# Patient Record
Sex: FEMALE | Race: WHITE | NOT HISPANIC OR LATINO | URBAN - METROPOLITAN AREA
[De-identification: names, ages, dates, MRNs, and addresses within clinical notes are randomized per-mention and may not be internally consistent; named-entity substitution may affect disease eponyms.]

---

## 2018-05-05 ENCOUNTER — INPATIENT (INPATIENT)
Facility: HOSPITAL | Age: 83
LOS: 2 days | Discharge: NURSING, REHAB OR LONG-TERM CARE | End: 2018-05-08
Attending: INTERNAL MEDICINE | Admitting: INTERNAL MEDICINE

## 2018-05-05 VITALS
SYSTOLIC BLOOD PRESSURE: 182 MMHG | OXYGEN SATURATION: 94 % | TEMPERATURE: 96 F | HEART RATE: 55 BPM | DIASTOLIC BLOOD PRESSURE: 81 MMHG | RESPIRATION RATE: 20 BRPM

## 2018-05-05 DIAGNOSIS — E03.9 HYPOTHYROIDISM, UNSPECIFIED: ICD-10-CM

## 2018-05-05 DIAGNOSIS — R07.9 CHEST PAIN, UNSPECIFIED: ICD-10-CM

## 2018-05-05 DIAGNOSIS — Z79.82 LONG TERM (CURRENT) USE OF ASPIRIN: ICD-10-CM

## 2018-05-05 DIAGNOSIS — I50.33 ACUTE ON CHRONIC DIASTOLIC (CONGESTIVE) HEART FAILURE: ICD-10-CM

## 2018-05-05 DIAGNOSIS — F03.90 UNSPECIFIED DEMENTIA WITHOUT BEHAVIORAL DISTURBANCE: ICD-10-CM

## 2018-05-05 DIAGNOSIS — I13.0 HYPERTENSIVE HEART AND CHRONIC KIDNEY DISEASE WITH HEART FAILURE AND STAGE 1 THROUGH STAGE 4 CHRONIC KIDNEY DISEASE, OR UNSPECIFIED CHRONIC KIDNEY DISEASE: ICD-10-CM

## 2018-05-05 DIAGNOSIS — I48.91 UNSPECIFIED ATRIAL FIBRILLATION: ICD-10-CM

## 2018-05-05 DIAGNOSIS — M19.90 UNSPECIFIED OSTEOARTHRITIS, UNSPECIFIED SITE: ICD-10-CM

## 2018-05-05 DIAGNOSIS — R32 UNSPECIFIED URINARY INCONTINENCE: ICD-10-CM

## 2018-05-05 DIAGNOSIS — N18.9 CHRONIC KIDNEY DISEASE, UNSPECIFIED: ICD-10-CM

## 2018-05-05 DIAGNOSIS — Z98.890 OTHER SPECIFIED POSTPROCEDURAL STATES: Chronic | ICD-10-CM

## 2018-05-05 DIAGNOSIS — E78.5 HYPERLIPIDEMIA, UNSPECIFIED: ICD-10-CM

## 2018-05-05 DIAGNOSIS — I25.10 ATHEROSCLEROTIC HEART DISEASE OF NATIVE CORONARY ARTERY WITHOUT ANGINA PECTORIS: ICD-10-CM

## 2018-05-05 DIAGNOSIS — Z95.0 PRESENCE OF CARDIAC PACEMAKER: ICD-10-CM

## 2018-05-05 LAB
ANION GAP SERPL CALC-SCNC: 13 MMOL/L — SIGNIFICANT CHANGE UP (ref 7–14)
ANION GAP SERPL CALC-SCNC: 14 MMOL/L — SIGNIFICANT CHANGE UP (ref 7–14)
APTT BLD: 29.1 SEC — SIGNIFICANT CHANGE UP (ref 27–39.2)
BASOPHILS # BLD AUTO: 0.01 K/UL — SIGNIFICANT CHANGE UP (ref 0–0.2)
BASOPHILS NFR BLD AUTO: 0.3 % — SIGNIFICANT CHANGE UP (ref 0–1)
BUN SERPL-MCNC: 14 MG/DL — SIGNIFICANT CHANGE UP (ref 10–20)
BUN SERPL-MCNC: 15 MG/DL — SIGNIFICANT CHANGE UP (ref 10–20)
CALCIUM SERPL-MCNC: 9.1 MG/DL — SIGNIFICANT CHANGE UP (ref 8.5–10.1)
CALCIUM SERPL-MCNC: 9.1 MG/DL — SIGNIFICANT CHANGE UP (ref 8.5–10.1)
CHLORIDE SERPL-SCNC: 102 MMOL/L — SIGNIFICANT CHANGE UP (ref 98–110)
CHLORIDE SERPL-SCNC: 103 MMOL/L — SIGNIFICANT CHANGE UP (ref 98–110)
CK MB CFR SERPL CALC: 5.1 NG/ML — SIGNIFICANT CHANGE UP (ref 0.6–6.3)
CK MB CFR SERPL CALC: 5.5 NG/ML — SIGNIFICANT CHANGE UP (ref 0.6–6.3)
CK SERPL-CCNC: 118 U/L — SIGNIFICANT CHANGE UP (ref 0–225)
CK SERPL-CCNC: 172 U/L — SIGNIFICANT CHANGE UP (ref 0–225)
CO2 SERPL-SCNC: 25 MMOL/L — SIGNIFICANT CHANGE UP (ref 17–32)
CO2 SERPL-SCNC: 25 MMOL/L — SIGNIFICANT CHANGE UP (ref 17–32)
CREAT SERPL-MCNC: 1.1 MG/DL — SIGNIFICANT CHANGE UP (ref 0.7–1.5)
CREAT SERPL-MCNC: 1.1 MG/DL — SIGNIFICANT CHANGE UP (ref 0.7–1.5)
EOSINOPHIL # BLD AUTO: 0.28 K/UL — SIGNIFICANT CHANGE UP (ref 0–0.7)
EOSINOPHIL NFR BLD AUTO: 7.9 % — SIGNIFICANT CHANGE UP (ref 0–8)
GLUCOSE SERPL-MCNC: 91 MG/DL — SIGNIFICANT CHANGE UP (ref 70–99)
GLUCOSE SERPL-MCNC: 99 MG/DL — SIGNIFICANT CHANGE UP (ref 70–99)
HCT VFR BLD CALC: 39.7 % — SIGNIFICANT CHANGE UP (ref 37–47)
HGB BLD-MCNC: 12.9 G/DL — SIGNIFICANT CHANGE UP (ref 12–16)
IMM GRANULOCYTES NFR BLD AUTO: 0.3 % — SIGNIFICANT CHANGE UP (ref 0.1–0.3)
INR BLD: 1.08 RATIO — SIGNIFICANT CHANGE UP (ref 0.65–1.3)
LYMPHOCYTES # BLD AUTO: 0.92 K/UL — LOW (ref 1.2–3.4)
LYMPHOCYTES # BLD AUTO: 25.8 % — SIGNIFICANT CHANGE UP (ref 20.5–51.1)
MAGNESIUM SERPL-MCNC: 2.1 MG/DL — SIGNIFICANT CHANGE UP (ref 1.8–2.4)
MCHC RBC-ENTMCNC: 32.5 G/DL — SIGNIFICANT CHANGE UP (ref 32–37)
MCHC RBC-ENTMCNC: 34 PG — HIGH (ref 27–31)
MCV RBC AUTO: 104.7 FL — HIGH (ref 81–99)
MONOCYTES # BLD AUTO: 0.61 K/UL — HIGH (ref 0.1–0.6)
MONOCYTES NFR BLD AUTO: 17.1 % — HIGH (ref 1.7–9.3)
NEUTROPHILS # BLD AUTO: 1.73 K/UL — SIGNIFICANT CHANGE UP (ref 1.4–6.5)
NEUTROPHILS NFR BLD AUTO: 48.6 % — SIGNIFICANT CHANGE UP (ref 42.2–75.2)
NRBC # BLD: 0 /100 WBCS — SIGNIFICANT CHANGE UP (ref 0–0)
NT-PROBNP SERPL-SCNC: HIGH PG/ML (ref 0–300)
PLATELET # BLD AUTO: 95 K/UL — LOW (ref 130–400)
POTASSIUM SERPL-MCNC: 4.4 MMOL/L — SIGNIFICANT CHANGE UP (ref 3.5–5)
POTASSIUM SERPL-MCNC: 5.9 MMOL/L — HIGH (ref 3.5–5)
POTASSIUM SERPL-SCNC: 4.4 MMOL/L — SIGNIFICANT CHANGE UP (ref 3.5–5)
POTASSIUM SERPL-SCNC: 5.9 MMOL/L — HIGH (ref 3.5–5)
PROTHROM AB SERPL-ACNC: 11.7 SEC — SIGNIFICANT CHANGE UP (ref 9.95–12.87)
RBC # BLD: 3.79 M/UL — LOW (ref 4.2–5.4)
RBC # FLD: 13.2 % — SIGNIFICANT CHANGE UP (ref 11.5–14.5)
SODIUM SERPL-SCNC: 141 MMOL/L — SIGNIFICANT CHANGE UP (ref 135–146)
SODIUM SERPL-SCNC: 141 MMOL/L — SIGNIFICANT CHANGE UP (ref 135–146)
TROPONIN T SERPL-MCNC: <0.01 NG/ML — SIGNIFICANT CHANGE UP
TROPONIN T SERPL-MCNC: <0.01 NG/ML — SIGNIFICANT CHANGE UP
WBC # BLD: 3.56 K/UL — LOW (ref 4.8–10.8)
WBC # FLD AUTO: 3.56 K/UL — LOW (ref 4.8–10.8)

## 2018-05-05 RX ORDER — ATORVASTATIN CALCIUM 80 MG/1
10 TABLET, FILM COATED ORAL AT BEDTIME
Qty: 0 | Refills: 0 | Status: DISCONTINUED | OUTPATIENT
Start: 2018-05-05 | End: 2018-05-08

## 2018-05-05 RX ORDER — SODIUM CHLORIDE 9 MG/ML
3 INJECTION INTRAMUSCULAR; INTRAVENOUS; SUBCUTANEOUS EVERY 8 HOURS
Qty: 0 | Refills: 0 | Status: DISCONTINUED | OUTPATIENT
Start: 2018-05-05 | End: 2018-05-05

## 2018-05-05 RX ORDER — ASPIRIN/CALCIUM CARB/MAGNESIUM 324 MG
81 TABLET ORAL DAILY
Qty: 0 | Refills: 0 | Status: DISCONTINUED | OUTPATIENT
Start: 2018-05-05 | End: 2018-05-08

## 2018-05-05 RX ORDER — ENOXAPARIN SODIUM 100 MG/ML
40 INJECTION SUBCUTANEOUS DAILY
Qty: 0 | Refills: 0 | Status: DISCONTINUED | OUTPATIENT
Start: 2018-05-05 | End: 2018-05-08

## 2018-05-05 RX ORDER — ASPIRIN/CALCIUM CARB/MAGNESIUM 324 MG
325 TABLET ORAL ONCE
Qty: 0 | Refills: 0 | Status: COMPLETED | OUTPATIENT
Start: 2018-05-05 | End: 2018-05-05

## 2018-05-05 RX ORDER — OXYBUTYNIN CHLORIDE 5 MG
5 TABLET ORAL DAILY
Qty: 0 | Refills: 0 | Status: DISCONTINUED | OUTPATIENT
Start: 2018-05-05 | End: 2018-05-08

## 2018-05-05 RX ORDER — AMIODARONE HYDROCHLORIDE 400 MG/1
100 TABLET ORAL DAILY
Qty: 0 | Refills: 0 | Status: DISCONTINUED | OUTPATIENT
Start: 2018-05-05 | End: 2018-05-07

## 2018-05-05 RX ORDER — LEVOTHYROXINE SODIUM 125 MCG
50 TABLET ORAL DAILY
Qty: 0 | Refills: 0 | Status: DISCONTINUED | OUTPATIENT
Start: 2018-05-05 | End: 2018-05-08

## 2018-05-05 RX ORDER — LABETALOL HCL 100 MG
100 TABLET ORAL
Qty: 0 | Refills: 0 | Status: DISCONTINUED | OUTPATIENT
Start: 2018-05-05 | End: 2018-05-08

## 2018-05-05 RX ORDER — PANTOPRAZOLE SODIUM 20 MG/1
40 TABLET, DELAYED RELEASE ORAL
Qty: 0 | Refills: 0 | Status: DISCONTINUED | OUTPATIENT
Start: 2018-05-05 | End: 2018-05-08

## 2018-05-05 RX ORDER — DOCUSATE SODIUM 100 MG
100 CAPSULE ORAL
Qty: 0 | Refills: 0 | Status: DISCONTINUED | OUTPATIENT
Start: 2018-05-05 | End: 2018-05-08

## 2018-05-05 RX ORDER — FUROSEMIDE 40 MG
20 TABLET ORAL DAILY
Qty: 0 | Refills: 0 | Status: DISCONTINUED | OUTPATIENT
Start: 2018-05-06 | End: 2018-05-07

## 2018-05-05 RX ORDER — FUROSEMIDE 40 MG
40 TABLET ORAL ONCE
Qty: 0 | Refills: 0 | Status: COMPLETED | OUTPATIENT
Start: 2018-05-05 | End: 2018-05-05

## 2018-05-05 RX ORDER — VALSARTAN 80 MG/1
320 TABLET ORAL DAILY
Qty: 0 | Refills: 0 | Status: DISCONTINUED | OUTPATIENT
Start: 2018-05-06 | End: 2018-05-08

## 2018-05-05 RX ORDER — CLONAZEPAM 1 MG
0.5 TABLET ORAL DAILY
Qty: 0 | Refills: 0 | Status: DISCONTINUED | OUTPATIENT
Start: 2018-05-05 | End: 2018-05-08

## 2018-05-05 RX ADMIN — Medication 40 MILLIGRAM(S): at 12:21

## 2018-05-05 RX ADMIN — Medication 100 MILLIGRAM(S): at 18:30

## 2018-05-05 RX ADMIN — Medication 40 MILLIGRAM(S): at 18:30

## 2018-05-05 RX ADMIN — Medication 325 MILLIGRAM(S): at 11:00

## 2018-05-05 RX ADMIN — Medication 0.5 MILLIGRAM(S): at 21:05

## 2018-05-05 RX ADMIN — SODIUM CHLORIDE 3 MILLILITER(S): 9 INJECTION INTRAMUSCULAR; INTRAVENOUS; SUBCUTANEOUS at 11:00

## 2018-05-05 RX ADMIN — ATORVASTATIN CALCIUM 10 MILLIGRAM(S): 80 TABLET, FILM COATED ORAL at 22:41

## 2018-05-05 NOTE — ED PROVIDER NOTE - ATTENDING CONTRIBUTION TO CARE
89F PMH dementia, [facility] resident, afib, ckd, chf, hypothyroid, htn, oa, PVD, PCM, thoracic aneurysm repair, p/w CP, sob. stopped lasix few days ago for unknown reasons. pt poor historian 2/2 dementia. pt states all symptoms have resolved and she is back to baseline. denies fever, cough, abd pain, nvdc, dysuria, BP, LE edema, ha, neck pain, trauma. on exam, AFVSS- bp noted, hypoxic on RA- no resp distress doing well on O2 NC, well sabrina nad, ncat, eomi, perrla, mmm, bilat rales, rrr nl s1s2 no mrg, abd soft ntnd, aaox3, no focal deficits, no le edema or calf ttp, a/p; concern for CHF exac, r/o ACS, H&P not c/w pe, dissection, esoph perf, tamponade, ptx, pna. will do labs, ekg/trop, bnp, CXR, asa,lasix, admit for diuresis

## 2018-05-05 NOTE — ED PROVIDER NOTE - OBJECTIVE STATEMENT
89yF poor historian from [facility] w/ anxiety, dementia, HTN, hypothyroidism, OA, AFib no AC s/p atrial PPM, DLD, CKD, thoracic AA s/p repair, PUD, urinary incontinence, constipation BIBEMS for CP & dyspnea. Patient is poor historian (she states she is still an active , repeats she has 7 children, and denies she has any CC/PMH/meds); information obtained from chart review.    Patient currently denies having any CP or dyspnea and only slightly acknowledges having a brief episode of midsternal CP this morning with an associated episode of brief dyspnea.    PMD Verónica 89yF poor historian from [facility] w/ anxiety, dementia, CAD, HTN, hypothyroidism, OA, AFib no AC s/p atrial PPM, DLD, CKD, thoracic AA s/p repair 2008, PUD, urinary incontinence, constipation BIBEMS for CP, dyspnea, /112. Patient is poor historian (she states she is still an active , repeats she has 7 children, and denies she has any CC/PMH/meds); information obtained from chart review.    Patient currently denies having any CP or dyspnea and only slightly acknowledges having a brief episode of midsternal CP this morning with an associated episode of brief dyspnea.    PMD Verónica 89yF poor historian from [facility] w/ anxiety, dementia, CAD, HTN, hypothyroidism, OA, AFib no AC s/p atrial PPM, DLD, CKD, thoracic AA s/p repair 2008, PUD, urinary incontinence, constipation BIBEMS for CP, dyspnea, /112. Patient is poor historian (she states she is still an active , repeats she has 7 children, and denies she has any CC/PMH/meds); information obtained from chart review.    Patient currently denies having any CP or dyspnea and only slightly acknowledges having a brief episode of midsternal CP this morning with an associated episode of brief dyspnea.    Writer called the [facility] ([phone]) and confirmed the patient's CC. Per [facility] staff, patient has a history of getting up in the morning, complaining of chest pain/dyspnea/BP of 244/122, and then returning to normal shortly thereafter. This occurred this morning as well per [facility] staff. No interventions were performed and SBP decreased to 182 in ED.    PMD Verónica (outpt), PMD2 Kaleigh (while at [facility], [phone]) 89yF poor historian from [facility] w/ anxiety, dementia, CAD, HTN, hypothyroidism, OA, AFib no AC s/p atrial PPM, DLD, CKD, thoracic AA s/p repair 2008, PUD, urinary incontinence, constipation BIBEMS for CP, dyspnea, /112. Patient is poor historian (she states she is still an active , repeats she has 7 children, and denies she has any CC/PMH/meds); information obtained from chart review.    Patient currently denies having any CP or dyspnea and only slightly acknowledges having a brief episode of midsternal CP this morning with an associated episode of brief dyspnea.    Writer called the [facility] ([phone]) and confirmed the patient's CC. Per [facility] staff, patient has a history of getting up in the morning, complaining of chest pain/dyspnea/BP of 244/122, and then returning to normal shortly thereafter. This occurred this morning as well per [facility] staff. No interventions were performed and SBP decreased to 182 in ED.    Per [facility] staff, pt's furosemide was stopped a few days ago by her PMD Kaleigh.    PMD Verónica (outpt), PMD2 Kaleigh (while at [facility], [phone])

## 2018-05-05 NOTE — ED ADULT NURSE NOTE - PMH
Afib    CKD (chronic kidney disease)    HLD (hyperlipidemia)    HTN (hypertension)    Hypothyroid    Osteoarthritis    PUD (peptic ulcer disease)    Thoracic aortic aneurysm without rupture

## 2018-05-05 NOTE — ED ADULT NURSE REASSESSMENT NOTE - NS ED NURSE REASSESS COMMENT FT1
Received pt awake, A&Ox3 denies chest pain or SOB at this time. pt placed back on cardiac monitor , VS stable. Safety maintained, Will continue to monitor

## 2018-05-05 NOTE — ED PROVIDER NOTE - PROGRESS NOTE DETAILS
discussed w Lydia Arzola, recommends admission under his name tele for CHF exac, requests alexandra for cards c/s

## 2018-05-05 NOTE — ED ADULT NURSE NOTE - OBJECTIVE STATEMENT
PT SENT BY [facility] FOR HTN, AND C/O CP AND SOB. AS PER EMS, HTN IMPROVED WITH 02. PT CURRENTLY DENYING CP.

## 2018-05-05 NOTE — CONSULT NOTE ADULT - SUBJECTIVE AND OBJECTIVE BOX
Date of Admission:    CHIEF COMPLAINT: CHF    HISTORY OF PRESENT ILLNESS: 89yFemale with PMH below presented to the hospital for for short of breath and uncontrolled HTN. patient now seen in no distress and daughter at bed side    PAST MEDICAL & SURGICAL HISTORY:  Urinary incontinence  PUD (peptic ulcer disease)  Thoracic aortic aneurysm without rupture  CKD (chronic kidney disease)  HLD (hyperlipidemia)  Afib  Osteoarthritis  Hypothyroid  HTN (hypertension)  H/O exploratory laparotomy: 1960s  History of thoracic aortic aneurysm repair: 2008    HEALTH ISSUES - PROBLEM Dx:        FAMILY HISTORY:  No pertinent family history in first degree relatives    Allergies    ciprofloxacin (Other)  morphine (Unknown)  oxycodone (Unknown)    Intolerances    	  Home Medications:  Acidophilus oral tablet: 1 tab(s) orally once a day (05 May 2018 11:01)  amiodarone 100 mg oral tablet: 1 tab(s) orally once a day (05 May 2018 11:01)  aspirin 81 mg oral tablet: 1 tab(s) orally once a day (05 May 2018 11:01)  clonazePAM 0.5 mg oral tablet: orally once a day (05 May 2018 11:01)  Colace 100 mg oral capsule: 1 cap(s) orally 2 times a day (05 May 2018 11:01)  Daily Kyle oral tablet: 1 tab(s) orally once a day (05 May 2018 11:01)  furosemide 20 mg oral tablet: 1 tab(s) orally once a day (05 May 2018 11:01)  labetalol 100 mg oral tablet: 1 tab(s) orally 2 times a day (05 May 2018 11:01)  levothyroxine 50 mcg (0.05 mg) oral tablet: 1 tab(s) orally once a day (05 May 2018 11:01)  Mapap 500 mg oral capsule:  (05 May 2018 11:01)  oxybutynin 5 mg/24 hours oral tablet, extended release: 1 tab(s) orally once a day (05 May 2018 11:01)  pravastatin 40 mg oral tablet: 1 tab(s) orally once a day (05 May 2018 11:01)  valsartan 320 mg oral tablet: 1 tab(s) orally once a day (05 May 2018 11:01)    MEDICATIONS  (STANDING):  amiodarone   Oral Tab/Cap - Peds 100 milliGRAM(s) Oral daily  aspirin  chewable 81 milliGRAM(s) Oral daily  atorvastatin 10 milliGRAM(s) Oral at bedtime  docusate sodium 100 milliGRAM(s) Oral two times a day  enoxaparin Injectable 40 milliGRAM(s) SubCutaneous daily  furosemide   Injectable 40 milliGRAM(s) IV Push once  labetalol 100 milliGRAM(s) Oral two times a day  levothyroxine 50 MICROGram(s) Oral daily  oxybutynin 5 milliGRAM(s) Oral daily  pantoprazole    Tablet 40 milliGRAM(s) Oral before breakfast    MEDICATIONS  (PRN):              SOCIAL HISTORY:    [ ] Non-smoker  [ ] Smoker  [ ] Alcohol      REVIEW OF SYSTEMS:  CONSTITUTIONAL: No fever, weight loss, or fatigue  CARDIOLOGY: PAtient denies chest pain, shortness of breath or syncopal episodes.   RESPIRATORY: denies shortness of breath, wheezeing.   NEUROLOGICAL: NO weakness, no focal deficits to report.  ENDOCRINOLOGICAL: no recent change in diabetic medications.   GI: no BRBPR, no N,V,diarrhea.    PSYCHIATRY: normal mood and affect  HEENT: no nasal discharge, no ecchymosis  SKIN: no ecchymosis, no breakdown  MUSCULOSKELETAL: Full range of motion x4.      PHYSICAL EXAM:  T(C): 37.1 (05-05-18 @ 15:55), Max: 37.1 (05-05-18 @ 15:55)  HR: 63 (05-05-18 @ 15:55) (55 - 63)  BP: 170/83 (05-05-18 @ 15:55) (170/83 - 182/81)  RR: 18 (05-05-18 @ 15:55) (18 - 20)  SpO2: 99% (05-05-18 @ 15:55) (94% - 99%)  Wt(kg): --  I&O's Summary    Daily     Daily     General Appearance: Normal	  Cardiovascular: Normal S1 S2, No JVD, No murmurs, No edema  Respiratory: Lungs clear to auscultation	  Psychiatry: A & O x 3, Mood & affect appropriate  Gastrointestinal:  Soft, Non-tender  Skin: No rashes, No ecchymoses, No cyanosis	  Neurologic: Non-focal  Extremities: Normal range of motion, No clubbing, cyanosis or edema  Vascular: Peripheral pulses palpable 2+ bilaterally        LABS:	 	                          12.9   3.56  )-----------( 95       ( 05 May 2018 11:13 )             39.7     05-05    141  |  103  |  15  ----------------------------<  99  5.9<H>   |  25  |  1.1    Ca    9.1      05 May 2018 11:13  Mg     2.1     05-05      CARDIAC MARKERS ( 05 May 2018 11:13 )  x     / <0.01 ng/mL / 172 U/L / x     / 5.5 ng/mL      PT/INR - ( 05 May 2018 11:13 )   PT: 11.70 sec;   INR: 1.08 ratio         PTT - ( 05 May 2018 11:13 )  PTT:29.1 sec    proBNP: Serum Pro-Brain Natriuretic Peptide: 25274 pg/mL (05-05 @ 11:13)    Lipid Profile:   HgA1c:   TSH:       CARDIAC MARKERS:            TELEMETRY EVENTS: 	    ECG:  	PAced  RADIOLOGY:  OTHER: 	    PREVIOUS DIAGNOSTIC TESTING:    [ ] Echocardiogram:  [ ]  Catheterization:  [ ] Stress Test:  	  	  ASSESSMENT/PLAN: 	  Patient with AAA, dementia, PPM present for sHORT OF BREATH.  RESTRT HER LASIX.  cONTINUE WITH AMIODARONE FOR sinus   Not in Afib now.  Off AC for unknown reason.  discussed with daughter in details.

## 2018-05-05 NOTE — H&P ADULT - ASSESSMENT
89y.o F w/ Thoracic aortic aneurysm s/p repair x 1, a. fib not on a/c, HTN, CKD, hypothyroidism, OA, PUD, urinary incontinence, CHF had lasix discontinued few days ago, presenting with CP and SOB x 1 dd along with /112 from assisted living, found to have b/l opacities on CXR along w/ elevated BNP and crackles on exam    1- hypoxia / SOB / CP: ddx most likely 2/2 CHF exacerbation vs. HTN urgency vs. unlikely PNA or COPD exacerbation vs. unlikely AAA rupture  admit to tele  s/p lasix 40 x 1 in ED - will give another lasix 40 iv in pm then 40 iv q24  repeat cxr in am  CPAP for now   keep spO2 > 90%  accurate I & O  low salt diet  check 2nd set CE  2D echo  cardio c/s Dr. Mckay as per PMD placed by ED team - please follow up    2- HTN: target SBP ~160 for now  c/w home meds valsartan and labetalol    3- a. fib not on a/c: c.w amiodarone    4- hypothyroidism: c/w synthroid   check tsh    5- GI ppx: protonix  DVT ppx: lovenox  DNR/DNI  OOB to chair 89y.o F w/ Thoracic aortic aneurysm s/p repair x 1, a. fib not on a/c, HTN, CKD, hypothyroidism, OA, PUD, urinary incontinence, CHF had lasix discontinued few days ago, presenting with CP and SOB x 1 dd along with /112 from assisted living, found to have b/l opacities on CXR along w/ elevated BNP and crackles on exam    1- hypoxia / SOB / CP: ddx most likely 2/2 CHF exacerbation vs. HTN urgency vs. unlikely PNA or COPD exacerbation vs. unlikely AAA rupture  admit to tele  s/p lasix 40 x 1 in ED - will give another lasix 40 iv in pm then 20 iv q24  repeat cxr in am  CPAP for now   keep spO2 > 90%  accurate I & O  low salt diet  check 2nd set CE  2D echo  cardio c/s Dr. Mckay as per PMD placed by ED team - please follow up    2- HTN: target SBP ~160 for now  c/w home meds valsartan and labetalol    3- a. fib not on a/c: c.w amiodarone    4- hypothyroidism: c/w synthroid   check tsh    5- GI ppx: protonix  DVT ppx: lovenox  DNR/DNI  OOB to chair

## 2018-05-05 NOTE — ED PROVIDER NOTE - PMH
Afib    CKD (chronic kidney disease)    HLD (hyperlipidemia)    HTN (hypertension)    Hypothyroid    Osteoarthritis    PUD (peptic ulcer disease)    Thoracic aortic aneurysm without rupture    Urinary incontinence

## 2018-05-05 NOTE — H&P ADULT - HISTORY OF PRESENT ILLNESS
89yF poor historian from [facility] w/ anxiety, dementia, CAD, HTN, hypothyroidism, OA, AFib no AC s/p atrial PPM, DLD, CKD, thoracic AA s/p repair 2008, PUD, urinary incontinence, constipation BIBEMS for CP, dyspnea, /112. Patient is poor historian (she states she is still an active , repeats she has 7 children, and denies she has any CC/PMH/meds); information obtained from chart review.    Patient currently denies having any CP or dyspnea and only slightly acknowledges having a brief episode of midsternal CP this morning with an associated episode of brief dyspnea.    Writer called the [facility] ([phone]) and confirmed the patient's CC. Per [facility] staff, patient has a history of getting up in the morning, complaining of chest pain/dyspnea/BP of 244/122, and then returning to normal shortly thereafter. This occurred this morning as well per [facility] staff. No interventions were performed and SBP decreased to 182 in ED.    	Per [facility] staff, pt's furosemide was stopped a few days ago by her PMD Kaleigh.    in ED:  / lasix 40 IV 89y.o F w/ listed PMHx including dementia, AAA s/p repair x 1, a. fib not on a/c, HTN, CHF had lasix discontinued few days ago, presenting with CP and SOB x 1 dd along with /112.  patient's daughter at bedside providing hx and she is her HCP.  on interview, patient denied CP or SOB but reports having felt that earlier during the day. as per daughter she had a brief episode of midsternal CP this morning with an associated episode of brief dyspnea and hypoxia on admission with SpO2 ~86%. pt had no fevers or chills, no cough or sputum, no lightheadedness, no palpitations, no LOC or falls.  ROS otherwise negative.    in ED:  / lasix 40 IV

## 2018-05-05 NOTE — ED PROVIDER NOTE - PHYSICAL EXAMINATION
Gen: NAD, pleasant, cooperative  HEENT: NCAT, EOMI  Cards: RRR, S1-S2 present, no G/M/R  Resp: coarse breath sounds b/l  Abd: S, NT, ND, +BS. No pain on palpation. Midline surgical scar.  Neuro: AOx3 despite poor historian, nonfocal  Ext: No clubbing/cyanosis, chronic venous stasis changes of b/l LEs, 2+ radial & posterior tibial pulses, trace pitting edema b/l

## 2018-05-05 NOTE — H&P ADULT - NSHPPHYSICALEXAM_GEN_ALL_CORE
GENERAL: NAD, well-developed  HEAD:  Atraumatic, Normocephalic  CHEST/LUNG: + crackles b/l, no rhonchi or wheezes  HEART: irregularly irregular, no murmurs, nl S1 S2  ABDOMEN: Soft, Nontender, Nondistended; Bowel sounds present  EXTREMITIES:  +1 LE edema, + pulses b/l, + venous stasis changes  PSYCH: AAOx1  NEUROLOGY: non-focal

## 2018-05-05 NOTE — H&P ADULT - NSHPLABSRESULTS_GEN_ALL_CORE
12.9   3.56  )-----------( 95       ( 05 May 2018 11:13 )             39.7       05-05    141  |  103  |  15  ----------------------------<  99  5.9<H>   |  25  |  1.1    Ca    9.1      05 May 2018 11:13  Mg     2.1     05-05      PT/INR - ( 05 May 2018 11:13 )   PT: 11.70 sec;   INR: 1.08 ratio         PTT - ( 05 May 2018 11:13 )  PTT:29.1 sec      CARDIAC MARKERS ( 05 May 2018 11:13 )  x     / <0.01 ng/mL / 172 U/L / x     / 5.5 ng/mL    EKG: atrial paced rhythm    CXR (unofficial): b/l diffuse interstitial opacities suggestive of pulm congestion

## 2018-05-06 LAB
ANION GAP SERPL CALC-SCNC: 13 MMOL/L — SIGNIFICANT CHANGE UP (ref 7–14)
BASOPHILS # BLD AUTO: 0.02 K/UL — SIGNIFICANT CHANGE UP (ref 0–0.2)
BASOPHILS NFR BLD AUTO: 0.4 % — SIGNIFICANT CHANGE UP (ref 0–1)
BUN SERPL-MCNC: 14 MG/DL — SIGNIFICANT CHANGE UP (ref 10–20)
CALCIUM SERPL-MCNC: 9.1 MG/DL — SIGNIFICANT CHANGE UP (ref 8.5–10.1)
CHLORIDE SERPL-SCNC: 96 MMOL/L — LOW (ref 98–110)
CO2 SERPL-SCNC: 30 MMOL/L — SIGNIFICANT CHANGE UP (ref 17–32)
CREAT SERPL-MCNC: 1 MG/DL — SIGNIFICANT CHANGE UP (ref 0.7–1.5)
EOSINOPHIL # BLD AUTO: 0.27 K/UL — SIGNIFICANT CHANGE UP (ref 0–0.7)
EOSINOPHIL NFR BLD AUTO: 5.2 % — SIGNIFICANT CHANGE UP (ref 0–8)
GLUCOSE SERPL-MCNC: 94 MG/DL — SIGNIFICANT CHANGE UP (ref 70–99)
HCT VFR BLD CALC: 42.7 % — SIGNIFICANT CHANGE UP (ref 37–47)
HGB BLD-MCNC: 14.5 G/DL — SIGNIFICANT CHANGE UP (ref 12–16)
IMM GRANULOCYTES NFR BLD AUTO: 0.4 % — HIGH (ref 0.1–0.3)
LYMPHOCYTES # BLD AUTO: 0.76 K/UL — LOW (ref 1.2–3.4)
LYMPHOCYTES # BLD AUTO: 14.6 % — LOW (ref 20.5–51.1)
MAGNESIUM SERPL-MCNC: 1.8 MG/DL — SIGNIFICANT CHANGE UP (ref 1.8–2.4)
MCHC RBC-ENTMCNC: 34 G/DL — SIGNIFICANT CHANGE UP (ref 32–37)
MCHC RBC-ENTMCNC: 34.2 PG — HIGH (ref 27–31)
MCV RBC AUTO: 100.7 FL — HIGH (ref 81–99)
MONOCYTES # BLD AUTO: 0.74 K/UL — HIGH (ref 0.1–0.6)
MONOCYTES NFR BLD AUTO: 14.2 % — HIGH (ref 1.7–9.3)
NEUTROPHILS # BLD AUTO: 3.39 K/UL — SIGNIFICANT CHANGE UP (ref 1.4–6.5)
NEUTROPHILS NFR BLD AUTO: 65.2 % — SIGNIFICANT CHANGE UP (ref 42.2–75.2)
PLATELET # BLD AUTO: 91 K/UL — LOW (ref 130–400)
POTASSIUM SERPL-MCNC: 3.7 MMOL/L — SIGNIFICANT CHANGE UP (ref 3.5–5)
POTASSIUM SERPL-SCNC: 3.7 MMOL/L — SIGNIFICANT CHANGE UP (ref 3.5–5)
RBC # BLD: 4.24 M/UL — SIGNIFICANT CHANGE UP (ref 4.2–5.4)
RBC # FLD: 13.1 % — SIGNIFICANT CHANGE UP (ref 11.5–14.5)
SODIUM SERPL-SCNC: 139 MMOL/L — SIGNIFICANT CHANGE UP (ref 135–146)
TSH SERPL-MCNC: 3.21 UIU/ML — SIGNIFICANT CHANGE UP (ref 0.27–4.2)
WBC # BLD: 5.2 K/UL — SIGNIFICANT CHANGE UP (ref 4.8–10.8)
WBC # FLD AUTO: 5.2 K/UL — SIGNIFICANT CHANGE UP (ref 4.8–10.8)

## 2018-05-06 RX ADMIN — Medication 0.5 MILLIGRAM(S): at 12:38

## 2018-05-06 RX ADMIN — PANTOPRAZOLE SODIUM 40 MILLIGRAM(S): 20 TABLET, DELAYED RELEASE ORAL at 08:00

## 2018-05-06 RX ADMIN — Medication 50 MICROGRAM(S): at 06:45

## 2018-05-06 RX ADMIN — Medication 100 MILLIGRAM(S): at 07:59

## 2018-05-06 RX ADMIN — ATORVASTATIN CALCIUM 10 MILLIGRAM(S): 80 TABLET, FILM COATED ORAL at 22:44

## 2018-05-06 RX ADMIN — Medication 100 MILLIGRAM(S): at 18:47

## 2018-05-06 RX ADMIN — Medication 100 MILLIGRAM(S): at 06:45

## 2018-05-06 RX ADMIN — VALSARTAN 320 MILLIGRAM(S): 80 TABLET ORAL at 06:45

## 2018-05-06 RX ADMIN — Medication 81 MILLIGRAM(S): at 12:38

## 2018-05-06 RX ADMIN — ENOXAPARIN SODIUM 40 MILLIGRAM(S): 100 INJECTION SUBCUTANEOUS at 12:38

## 2018-05-06 RX ADMIN — AMIODARONE HYDROCHLORIDE 100 MILLIGRAM(S): 400 TABLET ORAL at 06:45

## 2018-05-06 RX ADMIN — Medication 20 MILLIGRAM(S): at 06:45

## 2018-05-06 RX ADMIN — Medication 5 MILLIGRAM(S): at 12:38

## 2018-05-06 NOTE — PROGRESS NOTE ADULT - SUBJECTIVE AND OBJECTIVE BOX
Pt seen,  chart reviewed  Cardiology consult appreciated  Breathing better  F/U CxR better  on IV lasix        SOCIAL HISTORY:  n/a    REVIEW OF SYSTEMS:    Constitutional: No fever, weight loss or fatigue  ENT:  No difficulty hearing, tinnitus, vertigo; No sinus or throat pain  Neck: No pain or stiffness  Respiratory: No cough, wheezing, chills or hemoptysis  Cardiovascular: No chest pain, palpitations, shortness of breath, dizziness or leg swelling  Gastrointestinal: No abdominal or epigastric pain. No nausea, vomiting or hematemesis; No diarrhea or constipation. No melena or hematochezia.  Neurological: No headaches, memory loss, loss of strength, numbness or tremors  Musculoskeletal: No joint pain or swelling; No muscle, back or extremity pain  Psychiatric: No depression, anxiety, mood swings or difficulty sleeping    MEDICATIONS  (STANDING):  amiodarone   Oral Tab/Cap - Peds 100 milliGRAM(s) Oral daily  aspirin  chewable 81 milliGRAM(s) Oral daily  atorvastatin 10 milliGRAM(s) Oral at bedtime  clonazePAM Tablet 0.5 milliGRAM(s) Oral daily  docusate sodium 100 milliGRAM(s) Oral two times a day  enoxaparin Injectable 40 milliGRAM(s) SubCutaneous daily  furosemide   Injectable 20 milliGRAM(s) IV Push daily  labetalol 100 milliGRAM(s) Oral two times a day  levothyroxine 50 MICROGram(s) Oral daily  oxybutynin 5 milliGRAM(s) Oral daily  pantoprazole    Tablet 40 milliGRAM(s) Oral before breakfast  valsartan 320 milliGRAM(s) Oral daily    MEDICATIONS  (PRN):      Vital Signs Last 24 Hrs  T(C): 35.7 (06 May 2018 07:37), Max: 37.1 (05 May 2018 15:55)  T(F): 96.2 (06 May 2018 07:37), Max: 98.7 (05 May 2018 15:55)  HR: 60 (06 May 2018 07:37) (59 - 65)  BP: 121/58 (06 May 2018 07:37) (121/58 - 170/83)  BP(mean): --  RR: 18 (06 May 2018 07:37) (18 - 18)  SpO2: 92% (06 May 2018 07:37) (92% - 100%)    PHYSICAL EXAM:    Constitutional: NAD, well-groomed, well-developed  HEENT: PERRLA, EOMI, Normal Hearing, MMM  Neck: No LAD, No JVD  Back: Normal spine flexure, No CVA tenderness  Respiratory: rare ronchi(+)  Cardiovascular: S1 and S2, RRR, no M/G/R  Gastrointestinal: BS+, soft, NT/ND  Extremities: No peripheral edema  Vascular: 2+ peripheral pulses  Neurological: A/O x 3, no focal deficits    LABS:                        14.5   5.20  )-----------( 91       ( 06 May 2018 07:39 )             42.7     05-06    139  |  96<L>  |  14  ----------------------------<  94  3.7   |  30  |  1.0    Ca    9.1      06 May 2018 07:39  Mg     1.8     05-06      PT/INR - ( 05 May 2018 11:13 )   PT: 11.70 sec;   INR: 1.08 ratio         PTT - ( 05 May 2018 11:13 )  PTT:29.1 sec    Drug Screen Urine:  Alcohol Level  N/A        RADIOLOGY & ADDITIONAL STUDIES:  Noted in PACS  f/u CxR better

## 2018-05-06 NOTE — PROGRESS NOTE ADULT - ASSESSMENT
CHF improving  A Fib  CAD      await echo  If continues to improve may d/c back to [facility] tomorrow

## 2018-05-07 RX ORDER — FUROSEMIDE 40 MG
20 TABLET ORAL DAILY
Qty: 0 | Refills: 0 | Status: DISCONTINUED | OUTPATIENT
Start: 2018-05-07 | End: 2018-05-08

## 2018-05-07 RX ORDER — AMIODARONE HYDROCHLORIDE 400 MG/1
100 TABLET ORAL DAILY
Qty: 0 | Refills: 0 | Status: DISCONTINUED | OUTPATIENT
Start: 2018-05-07 | End: 2018-05-08

## 2018-05-07 RX ADMIN — ATORVASTATIN CALCIUM 10 MILLIGRAM(S): 80 TABLET, FILM COATED ORAL at 22:32

## 2018-05-07 RX ADMIN — PANTOPRAZOLE SODIUM 40 MILLIGRAM(S): 20 TABLET, DELAYED RELEASE ORAL at 08:00

## 2018-05-07 RX ADMIN — Medication 100 MILLIGRAM(S): at 17:56

## 2018-05-07 RX ADMIN — Medication 81 MILLIGRAM(S): at 11:20

## 2018-05-07 RX ADMIN — Medication 0.5 MILLIGRAM(S): at 11:20

## 2018-05-07 RX ADMIN — Medication 5 MILLIGRAM(S): at 11:18

## 2018-05-07 RX ADMIN — Medication 20 MILLIGRAM(S): at 06:27

## 2018-05-07 RX ADMIN — VALSARTAN 320 MILLIGRAM(S): 80 TABLET ORAL at 06:28

## 2018-05-07 RX ADMIN — Medication 100 MILLIGRAM(S): at 06:27

## 2018-05-07 RX ADMIN — ENOXAPARIN SODIUM 40 MILLIGRAM(S): 100 INJECTION SUBCUTANEOUS at 11:18

## 2018-05-07 RX ADMIN — AMIODARONE HYDROCHLORIDE 100 MILLIGRAM(S): 400 TABLET ORAL at 06:39

## 2018-05-07 RX ADMIN — Medication 50 MICROGRAM(S): at 06:28

## 2018-05-07 NOTE — PROGRESS NOTE ADULT - SUBJECTIVE AND OBJECTIVE BOX
SUBJ:  No new complains, confused    MEDICATIONS  (STANDING):  amiodarone    Tablet 100 milliGRAM(s) Oral daily  aspirin  chewable 81 milliGRAM(s) Oral daily  atorvastatin 10 milliGRAM(s) Oral at bedtime  clonazePAM Tablet 0.5 milliGRAM(s) Oral daily  docusate sodium 100 milliGRAM(s) Oral two times a day  enoxaparin Injectable 40 milliGRAM(s) SubCutaneous daily  furosemide   Injectable 20 milliGRAM(s) IV Push daily  labetalol 100 milliGRAM(s) Oral two times a day  levothyroxine 50 MICROGram(s) Oral daily  oxybutynin 5 milliGRAM(s) Oral daily  pantoprazole    Tablet 40 milliGRAM(s) Oral before breakfast  valsartan 320 milliGRAM(s) Oral daily    MEDICATIONS  (PRN):            Vital Signs Last 24 Hrs  T(C): 35.6 (07 May 2018 07:40), Max: 36.1 (06 May 2018 11:37)  T(F): 96 (07 May 2018 07:40), Max: 97 (06 May 2018 11:37)  HR: 60 (07 May 2018 07:40) (60 - 66)  BP: 109/54 (07 May 2018 07:40) (109/54 - 142/68)  BP(mean): --  RR: 18 (07 May 2018 07:40) (18 - 18)  SpO2: 92% (07 May 2018 07:40) (92% - 97%)     REVIEW OF SYSTEMS:  CONSTITUTIONAL: No fever, weight loss, or fatigue  CARDIOLOGY: PAtient denies chest pain, shortness of breath or syncopal episodes.   RESPIRATORY: denies shortness of breath, wheezeing.   NEUROLOGICAL: NO weakness, no focal deficits to report.  ENDOCRINOLOGICAL: no recent change in diabetic medications.   GI: no BRBPR, no N,V,diarrhea.    PSYCHIATRY: normal mood and affect  HEENT: no nasal discharge, no ecchymosis  SKIN: no ecchymosis, no breakdown  MUSCULOSKELETAL: Full range of motion x4.        PHYSICAL EXAM:  · CONSTITUTIONAL:	Well-developed, well nourished    BMI-  ·RESPIRATORY:   airway patent; breath sounds equal; good air movement; respirations non-labored; clear to auscultation bilaterally; no chest wall tenderness; no intercostal retractions; no rales,rhonchi or wheeze  · CARDIOVASCULAR	regular rate and rhythm  no rub  no murmur  normal PMI  · EXTREMITIES: No cyanosis, clubbing or edema  · VASCULAR: 	Equal and normal pulses (carotid, femoral, dorsalis pedis)  	  TELEMETRY:    ECG:    TTE:    LABS:                        14.5   5.20  )-----------( 91       ( 06 May 2018 07:39 )             42.7     05-06    139  |  96<L>  |  14  ----------------------------<  94  3.7   |  30  |  1.0    Ca    9.1      06 May 2018 07:39  Mg     1.8     05-06      CARDIAC MARKERS ( 05 May 2018 17:22 )  x     / <0.01 ng/mL / 118 U/L / x     / 5.1 ng/mL  CARDIAC MARKERS ( 05 May 2018 11:13 )  x     / <0.01 ng/mL / 172 U/L / x     / 5.5 ng/mL      PT/INR - ( 05 May 2018 11:13 )   PT: 11.70 sec;   INR: 1.08 ratio         PTT - ( 05 May 2018 11:13 )  PTT:29.1 sec    I&O's Summary    BNP  RADIOLOGY & ADDITIONAL STUDIES:    IMPRESSION AND PLAN:    CHF bettre, change lasix to PO  Echocardiogram reviewed EF 35%, not candidate for intervention or ICD.  Maximize medical therapy and BP control.  daily weight.

## 2018-05-07 NOTE — PROGRESS NOTE ADULT - ASSESSMENT
89 y/o F w/ listed PMHx including dementia, AAA s/p repair x 1, a. fib not on a/c, HTN, CHF had lasix discontinued few days ago, presenting with CP and SOB x 1 dd along with /112. She had a brief episode of midsternal CP on am of presentation with an associated episode of brief dyspnea and hypoxia on admission with SpO2 ~86%.    1. CHF Ex  -as per Cardio: CHF better, will change lasix to PO  Echocardiogram reviewed EF 35%, not candidate for intervention or ICD  will maximize medical therapy and BP control  daily weight, strict I/O  2. Afib: c/w amiodarone, not on any a/c from before  3. HTN: c/w valsartan and labetalol  4. Hypothyroidism: c/w synthroid  5. DVT PPx: lovenox 89 y/o F w/ listed PMHx including dementia, AAA s/p repair x 1, a. fib not on a/c, HTN, CHF had lasix discontinued few days ago, presenting with CP and SOB x 1 dd along with /112. She had a brief episode of midsternal CP on am of presentation with an associated episode of brief dyspnea and hypoxia on admission with SpO2 ~86%.    1. CHF Ex  -as per Cardio: CHF better, will change lasix to PO  Echocardiogram reviewed EF 35%, not candidate for intervention or ICD  will maximize medical therapy and BP control  daily weight, strict I/O  2. Afib: c/w amiodarone, not on any a/c from before  3. HTN: c/w valsartan and labetalol  4. Hypothyroidism: c/w synthroid  5. DVT PPx: lovenox  Will a/c for d/c 5/8 as per discussion with Dr Urban. 89 y/o F w/ listed PMHx including dementia, AAA s/p repair x 1, a. fib not on a/c, HTN, CHF had lasix discontinued few days ago, presenting with CP and SOB x 1 dd along with /112. She had a brief episode of midsternal CP on am of presentation with an associated episode of brief dyspnea and hypoxia on admission with SpO2 ~86%.    1. CHF Ex  -as per Cardio: CHF better, will change lasix to PO  Echocardiogram reviewed EF 35%, not candidate for intervention or ICD  will maximize medical therapy and BP control  daily weight, strict I/O  2. Afib: c/w amiodarone, not on any a/c from before  3. HTN: c/w valsartan and labetalol  4. Hypothyroidism: c/w synthroid  5. DVT PPx: lovenox  Will a/c for d/c 5/8 as per discussion with Dr Urban.  Family at bedside requesting PT/Rehab

## 2018-05-07 NOTE — PROGRESS NOTE ADULT - SUBJECTIVE AND OBJECTIVE BOX
Pt seen and examined  Chart reviewed  Daughter at bedside  pt comfortable,   dr Pyle input noted appreciated  spoke with house staff  (-) dyspnea, (-) CP    SOCIAL HISTORY:    REVIEW OF SYSTEMS:    Constitutional: No fever, weight loss or fatigue  ENT:  No difficulty hearing, tinnitus, vertigo; No sinus or throat pain  Neck: No pain or stiffness  Respiratory: No cough, wheezing, chills or hemoptysis  Cardiovascular: No chest pain, palpitations, shortness of breath, dizziness or leg swelling  Gastrointestinal: No abdominal or epigastric pain. No nausea, vomiting or hematemesis; No diarrhea or constipation. No melena or hematochezia.  Neurological: No headaches, memory loss, loss of strength, numbness or tremors  Musculoskeletal: (++)  joint pain or swelling; No muscle, back or extremity pain  Psychiatric: No depression, anxiety, mood swings or difficulty sleeping    MEDICATIONS  (STANDING):  amiodarone    Tablet 100 milliGRAM(s) Oral daily  aspirin  chewable 81 milliGRAM(s) Oral daily  atorvastatin 10 milliGRAM(s) Oral at bedtime  clonazePAM Tablet 0.5 milliGRAM(s) Oral daily  docusate sodium 100 milliGRAM(s) Oral two times a day  enoxaparin Injectable 40 milliGRAM(s) SubCutaneous daily  furosemide    Tablet 20 milliGRAM(s) Oral daily  labetalol 100 milliGRAM(s) Oral two times a day  levothyroxine 50 MICROGram(s) Oral daily  oxybutynin 5 milliGRAM(s) Oral daily  pantoprazole    Tablet 40 milliGRAM(s) Oral before breakfast  valsartan 320 milliGRAM(s) Oral daily    MEDICATIONS  (PRN):      Vital Signs Last 24 Hrs  T(C): 35.7 (07 May 2018 16:15), Max: 35.8 (06 May 2018 23:36)  T(F): 96.3 (07 May 2018 16:15), Max: 96.4 (06 May 2018 23:36)  HR: 59 (07 May 2018 16:15) (59 - 60)  BP: 111/60 (07 May 2018 16:15) (109/54 - 131/59)  BP(mean): --  RR: 18 (07 May 2018 16:15) (18 - 18)  SpO2: 95% (07 May 2018 16:15) (92% - 95%)    PHYSICAL EXAM:    Constitutional: NAD, well-groomed, well-developed  HEENT: PERRLA, EOMI, Normal Hearing, MMM  Neck: No LAD, No JVD  Back: Normal spine flexure, No CVA tenderness  Respiratory: CTAB/L  Cardiovascular: S1 and S2, RRR, no M/G/R  Gastrointestinal: BS+, soft, NT/ND  Extremities: No peripheral edema  Vascular: 2+ peripheral pulses  Neurological:, no focal deficits    LABS:                        14.5   5.20  )-----------( 91       ( 06 May 2018 07:39 )             42.7     05-06    139  |  96<L>  |  14  ----------------------------<  94  3.7   |  30  |  1.0    Ca    9.1      06 May 2018 07:39  Mg     1.8     05-06          Drug Screen Urine:  Alcohol Level        RADIOLOGY & ADDITIONAL STUDIES:  Reviewed in PACS

## 2018-05-07 NOTE — PROGRESS NOTE ADULT - ASSESSMENT
CHF optimal presently    Spoke to daughter  anxious to take her back to [facility] tomorrow.  D/C back to assisted living tomorrow 5/8/18  will get Physical therapy there  Will see her there next Friday  possible consider entresto there.  Spoke to house staff

## 2018-05-07 NOTE — PROGRESS NOTE ADULT - SUBJECTIVE AND OBJECTIVE BOX
89 y/o F w/ listed PMHx including dementia, AAA s/p repair x 1, a. fib not on a/c, HTN, CHF had lasix discontinued few days ago, presenting with CP and SOB x 1 dd along with /112. She had a brief episode of midsternal CP on am of presentation with an associated episode of brief dyspnea and hypoxia on admission with SpO2 ~86%.    Labs:  CARDIAC MARKERS ( 05 May 2018 17:22 )  x     / <0.01 ng/mL / 118 U/L / x     / 5.1 ng/mL               14.5   5.20  )-----------( 91       ( 06 May 2018 07:39 )             42.7     05-06  139  |  96<L>  |  14  ----------------------------<  94  3.7   |  30  |  1.0  Ca   9.1      06 May 2018 07:39  Mg     1.8     05-06    .Blood None  05-05-18   No growth to date.  --  --    General Exam: elderly woman reclining, +dementia, stating she is in [place] and rambling about her school days  Cardiac: RRR, S1S2  Lungs: hard to appreciate bc pt does not sit up properly, but able to talk comfortably on room air  LE: no swelling, +tenderness to palpation on bilateral lower extremities

## 2018-05-08 ENCOUNTER — TRANSCRIPTION ENCOUNTER (OUTPATIENT)
Age: 83
End: 2018-05-08

## 2018-05-08 VITALS — WEIGHT: 109.79 LBS

## 2018-05-08 RX ADMIN — Medication 100 MILLIGRAM(S): at 06:01

## 2018-05-08 RX ADMIN — PANTOPRAZOLE SODIUM 40 MILLIGRAM(S): 20 TABLET, DELAYED RELEASE ORAL at 11:13

## 2018-05-08 RX ADMIN — VALSARTAN 320 MILLIGRAM(S): 80 TABLET ORAL at 06:01

## 2018-05-08 RX ADMIN — Medication 81 MILLIGRAM(S): at 11:12

## 2018-05-08 RX ADMIN — Medication 50 MICROGRAM(S): at 06:01

## 2018-05-08 RX ADMIN — AMIODARONE HYDROCHLORIDE 100 MILLIGRAM(S): 400 TABLET ORAL at 06:01

## 2018-05-08 RX ADMIN — Medication 0.5 MILLIGRAM(S): at 11:12

## 2018-05-08 RX ADMIN — Medication 20 MILLIGRAM(S): at 06:01

## 2018-05-08 RX ADMIN — ENOXAPARIN SODIUM 40 MILLIGRAM(S): 100 INJECTION SUBCUTANEOUS at 11:09

## 2018-05-08 RX ADMIN — Medication 5 MILLIGRAM(S): at 11:08

## 2018-05-08 NOTE — DISCHARGE NOTE ADULT - MEDICATION SUMMARY - MEDICATIONS TO TAKE
I will START or STAY ON the medications listed below when I get home from the hospital:    Mapap 500 mg oral capsule  -- Indication: For fever, pain    aspirin 81 mg oral tablet  -- 1 tab(s) by mouth once a day  -- Indication: For Afib    valsartan 320 mg oral tablet  -- 1 tab(s) by mouth once a day  -- Indication: For HTN (hypertension)    amiodarone 100 mg oral tablet  -- 1 tab(s) by mouth once a day  -- Indication: For Afib    clonazePAM 0.5 mg oral tablet  -- orally once a day  -- Indication: For Anxiety    pravastatin 40 mg oral tablet  -- 1 tab(s) by mouth once a day  -- Indication: For DLD    labetalol 100 mg oral tablet  -- 1 tab(s) by mouth 2 times a day  -- Indication: For HTN (hypertension)    furosemide 20 mg oral tablet  -- 1 tab(s) by mouth once a day  -- Indication: For CHF exacerbation    Colace 100 mg oral capsule  -- 1 cap(s) by mouth 2 times a day  -- Indication: For Constipation    Acidophilus oral tablet  -- 1 tab(s) by mouth once a day  -- Indication: For GI Prophylaxis    levothyroxine 50 mcg (0.05 mg) oral tablet  -- 1 tab(s) by mouth once a day  -- Indication: For Hypothyroidism    oxybutynin 5 mg/24 hours oral tablet, extended release  -- 1 tab(s) by mouth once a day  -- Indication: For Urinary incontinence    Daily Kyle oral tablet  -- 1 tab(s) by mouth once a day  -- Indication: For Vitamin Supplementation I will START or STAY ON the medications listed below when I get home from the hospital:    Mapap 500 mg oral capsule  -- Indication: For Pain, fever    aspirin 81 mg oral tablet  -- 1 tab(s) by mouth once a day  -- Indication: For Afib    valsartan 320 mg oral tablet  -- 1 tab(s) by mouth once a day  -- Indication: For HTN (hypertension)    amiodarone 100 mg oral tablet  -- 1 tab(s) by mouth once a day  -- Indication: For Afib    clonazePAM 0.5 mg oral tablet  -- orally once a day  -- Indication: For mood disorder    pravastatin 40 mg oral tablet  -- 1 tab(s) by mouth once a day  -- Indication: For DLD    labetalol 100 mg oral tablet  -- 1 tab(s) by mouth 2 times a day  -- Indication: For HTN (hypertension)    furosemide 20 mg oral tablet  -- 1 tab(s) by mouth once a day  -- Indication: For CHF exacerbation    Colace 100 mg oral capsule  -- 1 cap(s) by mouth 2 times a day  -- Indication: For Constipation    Acidophilus oral tablet  -- 1 tab(s) by mouth once a day  -- Indication: For GI prophylaxis    levothyroxine 50 mcg (0.05 mg) oral tablet  -- 1 tab(s) by mouth once a day  -- Indication: For Hypothyroidism    oxybutynin 5 mg/24 hours oral tablet, extended release  -- 1 tab(s) by mouth once a day  -- Indication: For Urinary incontinence    Daily Kyle oral tablet  -- 1 tab(s) by mouth once a day  -- Indication: For vitamin supplementation

## 2018-05-08 NOTE — DISCHARGE NOTE ADULT - HOSPITAL COURSE
89 y/o F w/ listed PMHx including dementia, AAA s/p repair x 1, a. fib not on a/c, HTN, CHF had lasix discontinued few days ago, presenting with CP and SOB x 1 dd along with /112. Pt was switched to IV lasix and diuresed, she was also seen by Cardiology Dr Mckay. Pt was switched back to po lasix when appropriate and discharged back to [facility]. Dr Urban will evaluate pt when she is back at the nursing home and consider starting entresto there.

## 2018-05-08 NOTE — DISCHARGE NOTE ADULT - CARE PLAN
Principal Discharge DX:	CHF exacerbation  Goal:	medical management  Assessment and plan of treatment:	Continue to take lasix 20mg daily, please do not drink more than 800cc of fluid per day and maintain a low salt diet. Seek immediate medical attention if you experience respiratory distress.

## 2018-05-08 NOTE — DISCHARGE NOTE ADULT - PLAN OF CARE
medical management Continue to take lasix 20mg daily, please do not drink more than 800cc of fluid per day and maintain a low salt diet. Seek immediate medical attention if you experience respiratory distress.

## 2018-05-08 NOTE — PROGRESS NOTE ADULT - ASSESSMENT
CHF  optimal   CAD  OA        spoke to house staff  D/C back to the [facility] today  will f/u with Pt there on Fri.

## 2018-05-08 NOTE — DISCHARGE NOTE ADULT - CARE PROVIDER_API CALL
Lydia Farris), Internal Medicine  [address] [place] [address]  Phone: [phone]  Fax: [phone]    Xenia Mckay), Cardiology; Interventional Cardiology  [address] [place] [address]  Phone: [phone]  Fax: [phone]

## 2018-05-08 NOTE — PROGRESS NOTE ADULT - SUBJECTIVE AND OBJECTIVE BOX
Pt seen and examined  daughter at bedside  Feels much better, (-) CP  (-) dyspnea  No N/V        SOCIAL HISTORY:    REVIEW OF SYSTEMS:    Constitutional: No fever, weight loss or fatigue  ENT:  No difficulty hearing, tinnitus, vertigo; No sinus or throat pain  Neck: No pain or stiffness  Respiratory: No cough, wheezing, chills or hemoptysis  Cardiovascular: No chest pain, palpitations, shortness of breath, dizziness or leg swelling  Gastrointestinal: No abdominal or epigastric pain. No nausea, vomiting or hematemesis; No diarrhea or constipation. No melena or hematochezia.  Neurological: No headaches, memory loss, loss of strength, numbness or tremors  Musculoskeletal: No joint pain or swelling; No muscle, back or extremity pain  Psychiatric: No depression, anxiety, mood swings or difficulty sleeping    MEDICATIONS  (STANDING):  amiodarone    Tablet 100 milliGRAM(s) Oral daily  aspirin  chewable 81 milliGRAM(s) Oral daily  atorvastatin 10 milliGRAM(s) Oral at bedtime  clonazePAM Tablet 0.5 milliGRAM(s) Oral daily  docusate sodium 100 milliGRAM(s) Oral two times a day  enoxaparin Injectable 40 milliGRAM(s) SubCutaneous daily  furosemide    Tablet 20 milliGRAM(s) Oral daily  labetalol 100 milliGRAM(s) Oral two times a day  levothyroxine 50 MICROGram(s) Oral daily  oxybutynin 5 milliGRAM(s) Oral daily  pantoprazole    Tablet 40 milliGRAM(s) Oral before breakfast  valsartan 320 milliGRAM(s) Oral daily    MEDICATIONS  (PRN):      Vital Signs Last 24 Hrs  T(C): 36.7 (08 May 2018 07:52), Max: 36.7 (08 May 2018 07:52)  T(F): 98.1 (08 May 2018 07:52), Max: 98.1 (08 May 2018 07:52)  HR: 62 (08 May 2018 07:52) (59 - 67)  BP: 118/62 (08 May 2018 07:52) (111/60 - 153/69)  BP(mean): --  RR: 20 (08 May 2018 07:52) (18 - 20)  SpO2: 97% (08 May 2018 07:52) (95% - 97%)    PHYSICAL EXAM:    Constitutional: NAD, well-groomed, well-developed  HEENT: PERRLA, EOMI, Normal Hearing, MMM  Neck: No LAD, No JVD  Back: Normal spine flexure, No CVA tenderness  Respiratory:   Ronchi (+)  Cardiovascular: S1 and S2, RRR, no M/G/R  Gastrointestinal: BS+, soft, NT/ND  Extremities: No peripheral edema  Vascular: 2+ peripheral pulses  Neurological: A/O x 3, no focal deficits    LABS:              Drug Screen Urine:  Alcohol Level   n/a        RADIOLOGY & ADDITIONAL STUDIES:  Reviewed in PACS

## 2018-05-08 NOTE — DISCHARGE NOTE ADULT - PATIENT PORTAL LINK FT
You can access the [facility] Patient Portal, offered by [facility], by registering with the following website: [email or web address]

## 2018-05-11 LAB
CULTURE RESULTS: SIGNIFICANT CHANGE UP
SPECIMEN SOURCE: SIGNIFICANT CHANGE UP

## 2018-10-31 ENCOUNTER — OUTPATIENT (OUTPATIENT)
Dept: OUTPATIENT SERVICES | Facility: HOSPITAL | Age: 83
LOS: 1 days | Discharge: HOME | End: 2018-10-31

## 2018-10-31 DIAGNOSIS — Z98.890 OTHER SPECIFIED POSTPROCEDURAL STATES: Chronic | ICD-10-CM

## 2018-10-31 DIAGNOSIS — R78.81 BACTEREMIA: ICD-10-CM

## 2018-11-01 PROBLEM — I10 ESSENTIAL (PRIMARY) HYPERTENSION: Chronic | Status: ACTIVE | Noted: 2018-05-05

## 2018-11-01 PROBLEM — E03.9 HYPOTHYROIDISM, UNSPECIFIED: Chronic | Status: ACTIVE | Noted: 2018-05-05

## 2018-11-01 PROBLEM — M19.90 UNSPECIFIED OSTEOARTHRITIS, UNSPECIFIED SITE: Chronic | Status: ACTIVE | Noted: 2018-05-05

## 2018-11-01 PROBLEM — R32 UNSPECIFIED URINARY INCONTINENCE: Chronic | Status: ACTIVE | Noted: 2018-05-05

## 2018-11-01 PROBLEM — K27.9 PEPTIC ULCER, SITE UNSPECIFIED, UNSPECIFIED AS ACUTE OR CHRONIC, WITHOUT HEMORRHAGE OR PERFORATION: Chronic | Status: ACTIVE | Noted: 2018-05-05

## 2018-11-01 PROBLEM — N18.9 CHRONIC KIDNEY DISEASE, UNSPECIFIED: Chronic | Status: ACTIVE | Noted: 2018-05-05

## 2018-11-01 PROBLEM — I48.91 UNSPECIFIED ATRIAL FIBRILLATION: Chronic | Status: ACTIVE | Noted: 2018-05-05

## 2018-11-01 PROBLEM — E78.5 HYPERLIPIDEMIA, UNSPECIFIED: Chronic | Status: ACTIVE | Noted: 2018-05-05

## 2019-01-28 ENCOUNTER — EMERGENCY (EMERGENCY)
Facility: HOSPITAL | Age: 84
LOS: 0 days | Discharge: HOME | End: 2019-01-28
Attending: EMERGENCY MEDICINE | Admitting: EMERGENCY MEDICINE

## 2019-01-28 VITALS
OXYGEN SATURATION: 98 % | TEMPERATURE: 98 F | SYSTOLIC BLOOD PRESSURE: 132 MMHG | RESPIRATION RATE: 20 BRPM | HEART RATE: 71 BPM | DIASTOLIC BLOOD PRESSURE: 99 MMHG

## 2019-01-28 VITALS
OXYGEN SATURATION: 95 % | SYSTOLIC BLOOD PRESSURE: 160 MMHG | DIASTOLIC BLOOD PRESSURE: 72 MMHG | TEMPERATURE: 96 F | RESPIRATION RATE: 18 BRPM | HEART RATE: 72 BPM

## 2019-01-28 DIAGNOSIS — S09.90XA UNSPECIFIED INJURY OF HEAD, INITIAL ENCOUNTER: ICD-10-CM

## 2019-01-28 DIAGNOSIS — Z88.1 ALLERGY STATUS TO OTHER ANTIBIOTIC AGENTS STATUS: ICD-10-CM
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
## 2019-01-28 DIAGNOSIS — Z98.890 OTHER SPECIFIED POSTPROCEDURAL STATES: Chronic | ICD-10-CM

## 2019-01-28 DIAGNOSIS — E03.9 HYPOTHYROIDISM, UNSPECIFIED: ICD-10-CM
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
## 2019-01-28 DIAGNOSIS — N18.9 CHRONIC KIDNEY DISEASE, UNSPECIFIED: ICD-10-CM
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
## 2019-01-28 DIAGNOSIS — E78.5 HYPERLIPIDEMIA, UNSPECIFIED: ICD-10-CM

## 2019-01-28 DIAGNOSIS — I12.9 HYPERTENSIVE CHRONIC KIDNEY DISEASE WITH STAGE 1 THROUGH STAGE 4 CHRONIC KIDNEY DISEASE, OR UNSPECIFIED CHRONIC KIDNEY DISEASE: ICD-10-CM

## 2019-01-28 DIAGNOSIS — I48.91 UNSPECIFIED ATRIAL FIBRILLATION: ICD-10-CM

## 2019-01-28 DIAGNOSIS — I71.9 AORTIC ANEURYSM OF UNSPECIFIED SITE, WITHOUT RUPTURE: ICD-10-CM

## 2019-01-28 LAB
ALBUMIN SERPL ELPH-MCNC: 4 G/DL — SIGNIFICANT CHANGE UP (ref 3.5–5.2)
ALP SERPL-CCNC: 57 U/L — SIGNIFICANT CHANGE UP (ref 30–115)
ALT FLD-CCNC: 14 U/L — SIGNIFICANT CHANGE UP (ref 0–41)
ANION GAP SERPL CALC-SCNC: 14 MMOL/L — SIGNIFICANT CHANGE UP (ref 7–14)
APTT BLD: 29.5 SEC — SIGNIFICANT CHANGE UP (ref 27–39.2)
AST SERPL-CCNC: 26 U/L — SIGNIFICANT CHANGE UP (ref 0–41)
BASOPHILS # BLD AUTO: 0.02 K/UL — SIGNIFICANT CHANGE UP (ref 0–0.2)
BASOPHILS NFR BLD AUTO: 0.6 % — SIGNIFICANT CHANGE UP (ref 0–1)
BILIRUB SERPL-MCNC: 0.3 MG/DL — SIGNIFICANT CHANGE UP (ref 0.2–1.2)
BLD GP AB SCN SERPL QL: SIGNIFICANT CHANGE UP
BUN SERPL-MCNC: 17 MG/DL — SIGNIFICANT CHANGE UP (ref 10–20)
CALCIUM SERPL-MCNC: 9.2 MG/DL — SIGNIFICANT CHANGE UP (ref 8.5–10.1)
CHLORIDE SERPL-SCNC: 102 MMOL/L — SIGNIFICANT CHANGE UP (ref 98–110)
CO2 SERPL-SCNC: 26 MMOL/L — SIGNIFICANT CHANGE UP (ref 17–32)
CREAT SERPL-MCNC: 1.2 MG/DL — SIGNIFICANT CHANGE UP (ref 0.7–1.5)
EOSINOPHIL # BLD AUTO: 0.15 K/UL — SIGNIFICANT CHANGE UP (ref 0–0.7)
EOSINOPHIL NFR BLD AUTO: 4.6 % — SIGNIFICANT CHANGE UP (ref 0–8)
ETHANOL SERPL-MCNC: <10 MG/DL — HIGH
GLUCOSE SERPL-MCNC: 86 MG/DL — SIGNIFICANT CHANGE UP (ref 70–99)
HCT VFR BLD CALC: 34.1 % — LOW (ref 37–47)
HGB BLD-MCNC: 11.1 G/DL — LOW (ref 12–16)
IMM GRANULOCYTES NFR BLD AUTO: 0.3 % — SIGNIFICANT CHANGE UP (ref 0.1–0.3)
INR BLD: 1.07 RATIO — SIGNIFICANT CHANGE UP (ref 0.65–1.3)
LIDOCAIN IGE QN: 41 U/L — SIGNIFICANT CHANGE UP (ref 7–60)
LYMPHOCYTES # BLD AUTO: 1.03 K/UL — LOW (ref 1.2–3.4)
LYMPHOCYTES # BLD AUTO: 31.6 % — SIGNIFICANT CHANGE UP (ref 20.5–51.1)
MCHC RBC-ENTMCNC: 32.6 G/DL — SIGNIFICANT CHANGE UP (ref 32–37)
MCHC RBC-ENTMCNC: 35.1 PG — HIGH (ref 27–31)
MCV RBC AUTO: 107.9 FL — HIGH (ref 81–99)
MONOCYTES # BLD AUTO: 0.68 K/UL — HIGH (ref 0.1–0.6)
MONOCYTES NFR BLD AUTO: 20.9 % — HIGH (ref 1.7–9.3)
NEUTROPHILS # BLD AUTO: 1.37 K/UL — LOW (ref 1.4–6.5)
NEUTROPHILS NFR BLD AUTO: 42 % — LOW (ref 42.2–75.2)
NRBC # BLD: 0 /100 WBCS — SIGNIFICANT CHANGE UP (ref 0–0)
PLATELET # BLD AUTO: 89 K/UL — LOW (ref 130–400)
POTASSIUM SERPL-MCNC: 4.9 MMOL/L — SIGNIFICANT CHANGE UP (ref 3.5–5)
POTASSIUM SERPL-SCNC: 4.9 MMOL/L — SIGNIFICANT CHANGE UP (ref 3.5–5)
PROT SERPL-MCNC: 6.3 G/DL — SIGNIFICANT CHANGE UP (ref 6–8)
PROTHROM AB SERPL-ACNC: 12.3 SEC — SIGNIFICANT CHANGE UP (ref 9.95–12.87)
RBC # BLD: 3.16 M/UL — LOW (ref 4.2–5.4)
RBC # FLD: 12.9 % — SIGNIFICANT CHANGE UP (ref 11.5–14.5)
SODIUM SERPL-SCNC: 142 MMOL/L — SIGNIFICANT CHANGE UP (ref 135–146)
TYPE + AB SCN PNL BLD: SIGNIFICANT CHANGE UP
WBC # BLD: 3.26 K/UL — LOW (ref 4.8–10.8)
WBC # FLD AUTO: 3.26 K/UL — LOW (ref 4.8–10.8)

## 2019-01-28 NOTE — ED PROVIDER NOTE - PROGRESS NOTE DETAILS
Spoke with CT surg PA will come assess patient pt ambualted, clear by trauma, spoke to ct surgery. no f/u. pt nonsmoker, no need for f/u of nodule. dc home pt ambulated, clear by trauma, spoke to ct surgery. no f/u. pt nonsmoker, no need for f/u of nodule. dc home

## 2019-01-28 NOTE — ED PROVIDER NOTE - NS ED ROS FT
Constitutional: No fevers/chills.    Head: (+) injury, (-) headache.    Eyes:  No visual changes, eye pain or discharge. No injury.    ENMT:  No hearing changes, pain, discharge or infections. No neck pain or stiffness. No loss ROM.    Cardiac:  No chest pain, SOB or edema. No chest pain with exertion.    Respiratory:  No cough or respiratory distress.     GI:  No nausea, vomiting, diarrhea or abdominal pain. No anorexia. No change in PO intake.    :  No frequency, urgency or burning. No change in urine output.    MS:  No leg pain, leg swelling, myalgia, muscle weakness, joint pain or back pain. No loss ROM.    Neuro:  No dizziness or weakness.  No LOC.    Skin:  No skin rash.

## 2019-01-28 NOTE — ED PROVIDER NOTE - PHYSICAL EXAMINATION
GEN: Well appearing, in no apparent distress.    HEAD:  Normocephalic, 1 cm skin avulsion to left forehead no lacerations.    EYES:  Clear conjunctivae without injection, drainage or discharge.    ENMT:  Nasal mucosa moist; mouth moist without ulcerations or lesions; throat moist without erythema, exudate, ulcerations or lesions.    NECK:  Supple, no masses. Normal ROM.    CARDIAC:  RRR, normal S1 and S2, no murmurs, rubs or gallops.    RESP:  Respiratory rate and effort appear normal; lungs are clear to auscultation bilaterally; no rhonchi, rales or wheezes.    ABDOMEN:  Soft, non-tender, non-distended, no masses. Normal BS throughout.    MUSCULOSKELETAL: No leg swelling, no calf tenderness. Patient able to ambulate without difficulty.  NEURO:  AAO x 3. Motor 5/5. Sensory intact. CN II – XII intact.     SKIN:  Normal skin color for age and race, well-perfused; warm and dry.

## 2019-01-28 NOTE — ED PROVIDER NOTE - OBJECTIVE STATEMENT
89 yo female with PMHx Afib, CKD, HTN, HLD, hypothyroidism, PUD presents for fall when moving from chair to wheelchair. (+) head trauma (-) LOC. Patient initially was AAO x 3 then with EMS she became AAO x 1 and then on arrival to ED patient has been AAO x 3. Patient denies fevers, chest pain, SOB, abdominal pain, nausea, vomiting, diarrhea, dizziness, weakness, changes in PO intake, changes in urine output.

## 2019-01-28 NOTE — CONSULT NOTE ADULT - ASSESSMENT
ASSESSMENT:  89y old f s/p fall from standing, +HT, ?LOC, on aspirin    PLAN:    - f/u labs  - f/u scans ASSESSMENT:  89y old f s/p fall from standing, +HT, ?LOC, on aspirin    PLAN:    - f/u labs  - f/u scans  Senior Note  I have personaly seen and examined pt  Images reviewed  No acute traumatic injuries  No trauma intervention  Attending aware and agrees with plan

## 2019-01-28 NOTE — CONSULT NOTE ADULT - SUBJECTIVE AND OBJECTIVE BOX
Surgeon: /Lor/ Monica    Consult requesting by: ED attending    HISTORY OF PRESENT ILLNESS:  89F with PMH significant for urinary incontinence, PUD, CKD, thoracic aortic aneurysm repaired in 2008, HLD, HTN, and OA who presented to the ED s/p fall from standing.  Patient states she does not remember the fall or the mechanism of the fall and is unsure if she lost consciousness.  EMS reported that she was confused in the ambulance, however on arrival and in the trauma bay the patient was AOx3 with GCS 15.  Patient complains of pain over left forehead, denies fever, chills, chest pain, abdominal pain, nausea, vomiting, headache, dizziness, shortness of breath. Patient was found to have an ascending aortic aneurysm on CT scan measuring 7.4x7cm.    NYHA functional class    [ ] Class I (no limitation) [ ] Class II (slight limitation) [ ] Class III (marked limitation) [ ] Class IV (symptoms at rest)    PAST MEDICAL & SURGICAL HISTORY:  Pacemaker  Urinary incontinence  PUD (peptic ulcer disease)  Thoracic aortic aneurysm without rupture  CKD (chronic kidney disease)  HLD (hyperlipidemia)  Afib  Osteoarthritis  Hypothyroid  HTN (hypertension)  H/O exploratory laparotomy: 1960s  History of thoracic aortic aneurysm repair: 2008      HOME MEDICATIONS  (STANDING):Acidophilus oral tablet: 1 tab(s) orally once a day (05 May 2018 11:01)  amiodarone 100 mg oral tablet: 1 tab(s) orally once a day (05 May 2018 11:01)  aspirin 81 mg oral tablet: 1 tab(s) orally once a day (05 May 2018 11:01)  clonazePAM 0.5 mg oral tablet: orally once a day (05 May 2018 11:01)  Colace 100 mg oral capsule: 1 cap(s) orally 2 times a day (05 May 2018 11:01)  Daily Vit E oral tablet: 1 tab(s) orally once a day (05 May 2018 11:01)  furosemide 20 mg oral tablet: 1 tab(s) orally once a day (05 May 2018 11:01)  labetalol 100 mg oral tablet: 1 tab(s) orally 2 times a day (05 May 2018 11:01)  levothyroxine 50 mcg (0.05 mg) oral tablet: 1 tab(s) orally once a day (05 May 2018 11:01)  Mapap 500 mg oral capsule:  (05 May 2018 11:01)  oxybutynin 5 mg/24 hours oral tablet, extended release: 1 tab(s) orally once a day (05 May 2018 11:01)  pravastatin 40 mg oral tablet: 1 tab(s) orally once a day (05 May 2018 11:01)  valsartan 320 mg oral tablet: 1 tab(s) orally once a day (05 May 2018 11:01)        Allergies    ciprofloxacin (Other)  morphine (Unknown)  oxycodone (Unknown)    Intolerances        SOCIAL HISTORY:  Smoker: [ ] Yes  [ ] No        PACK YEARS:                         WHEN QUIT?  ETOH use: [ ] Yes  [ ] No              FREQUENCY / QUANTITY:  Ilicit Drug use:  [ ] Yes  [ ] No  Occupation:  Lives with:  Assisted device use:  5 meter walk test: 1____sec, 2____sec, 3___sec  FAMILY HISTORY:  No pertinent family history in first degree relatives      Review of Systems  CONSTITUTIONAL:  Fevers[ ] chills[ ] sweats[ ] fatigue[ ] weight loss[ ] weight gain [ ]                                     NEGATIVE [X ]   NEURO:  parathesias[ ] seizures [ ]  syncope [ ]  confusion [ ]                                                                                NEGATIVE[ X]   EYES: glasses[ ]  blurry vision[ ]  discharge[ ] pain[ ] glaucoma [ ]                                                                          NEGATIVE[X ]   ENMT:  difficulty hearing [ ]  vertigo[ ]  dysphagia[ ] epistaxis[ ] recent dental work [ ]                                    NEGATIVE[ X]   CV:  chest pain[ ] palpitations[ ] ODONNELL [ ] diaphoresis [ ]                                                                                           NEGATIVE[ X]   RESPIRATORY:  wheezing[ ] SOB[ ] cough [ ] sputum[ ] hemoptysis[ ]                                                                  NEGATIVE[ ]   GI:  nausea[ ]  vommiting [ ]  diarrhea[ ] constipation [ ] melena [ ]                                                                      NEGATIVE[ X]   : hematuria[ ]  dysuria[ ] urgency[ ] incontinence[ ]                                                                                            NEGATIVE[ X]   MUSKULOSKELETAL:  arthritis[ ]  joint swelling [ ] muscle weakness [ ] Hx vein stripping [ ]                             NEGATIVE[X ]   SKIN/BREAST:  rash[ ] itching [ ]  hair loss[ ] masses[ ]                                                                                              NEGATIVE[ X]   PSYCH:  dementia [ ] depresion [ ] anxiety[ ]                                                                                                               NEGATIVE[X ]   HEME/LYMPH:  bruises easily[ ] enlarged lymph nodes[ ] tender lymph nodes[ ]                                               NEGATIVE[ X]   ENDOCRINE:  cold intolerance[ ] heat intolerance[ ] polydipsia[ ]                                                                          NEGATIVE[ X]     PHYSICAL EXAM  Vital Signs Last 24 Hrs  T(C): 36.7 (28 Jan 2019 12:33), Max: 36.7 (28 Jan 2019 12:33)  T(F): 98 (28 Jan 2019 12:33), Max: 98 (28 Jan 2019 12:33)  HR: 71 (28 Jan 2019 12:33) (71 - 71)  BP: 132/99 (28 Jan 2019 12:33) (132/99 - 132/99)  BP(mean): --  RR: 20 (28 Jan 2019 12:33) (20 - 20)  SpO2: 98% (28 Jan 2019 12:33) (98% - 98%)  Right arm bp: Left arm bp;    CONSTITUTIONAL:                                                                          WNL[ ]   Neuro: WNL[ ] Normal exam oriented to person/place & time with no focal motor or sensory  deficits. Other                     Eyes: WNL[ ]   Normal exam of conjunctiva & lids, pupils equally reactive. Other     ENT: WNL[ ]    Normal exam of nasal/oral mucosa with absence of cyanosis. Other  Neck: WNL[ ]  Normal exam of jugular veins, trachea & thyroid. Other  Chest: WNL[ ] Normal lung exam with good air movement absence of wheezes, rales, or rhonchi: Other                                                                                CV:  Auscultation: normal [ ] S3[ ] S4[ ] Irregular [ ] Rub[ ] Clicks[ ]    Murmurs none:[ ]systolic [ ]  diastolic [ ] holosystolic [ ]  Carotids: No Bruits[ ] Other                 Abdominal Aorta: normal [ ] nonpalpable[ ]Other                                                                                      GI:           WNL[ ] Normal exam of abdomen, liver & spleen with no noted masses or tenderness. Other                                                                                                        Extremities: WNL[ ] Normal no evidence of cyanosis or deformity Edema: none[ ]trace[ ]1+[ ]2+[ ]3+[ ]4+[ ]  Lower Extremity Pulses: Right[ ] Left[ ]Varicosities[ ]  SKIN :WNL[ ] Normal exam to inspection & palation. Other:                                                          LABS:                        11.1   3.26  )-----------( 89       ( 28 Jan 2019 12:42 )             34.1     01-28    142  |  102  |  17  ----------------------------<  86  4.9   |  26  |  1.2    Ca    9.2      28 Jan 2019 12:42    TPro  6.3  /  Alb  4.0  /  TBili  0.3  /  DBili  x   /  AST  26  /  ALT  14  /  AlkPhos  57  01-28    PT/INR - ( 28 Jan 2019 12:42 )   PT: 12.30 sec;   INR: 1.07 ratio         PTT - ( 28 Jan 2019 12:42 )  PTT:29.5 sec    CT of the chest:  EXAM:  CT ANGIO ABD PELV (W)AW IC            PROCEDURE DATE:  01/28/2019      INTERPRETATION:  CTA chest, abdomen and pelvis with IV contrast    CLINICAL HISTORY/REASON FOR EXAM: Trauma. Aortic aneurysm.    TECHNIQUE: Contiguous axial CTA images were obtained from the thoracic   inlet to the pubic symphysis following administration of Optiray 320   intravenous contrast. Arterial phase images obtained. Oral contrast was   not administered. Reformatted images in the coronal and sagittal planes   were acquired.    COMPARISON: CT abdomen and pelvis August 9, 2009, CT chest May 8, 2007.   Chest x-ray May 5, 2018.      FINDINGS:    CHEST:    LUNGS, PLEURA, AIRWAYS: Trace right pleural effusion. No pneumothorax.   Central airways patent    PULMONARY NODULES: Left upper lobe 0.3 cm pulmonary nodule (series 6,   image 59).    THORACIC NODES: No mediastinal, hilar, supraclavicular, or axillary   lymphadenopathy.    MEDIASTINUM/GREAT VESSELS: No pericardial effusion. Cardiomegaly. Marked   aneurysmal dilation of ascending thoracic aorta, 7.4 x 7 cm. Aortic arch   dilated up to 4.2 cm, descending thoracic aorta up to 3.5 cm.   Atherosclerotic mixed plaque of aorta and its major branches. Left pacing   device. Post median sternotomy.      ABDOMEN/PELVIS:    HEPATOBILIARY: Unremarkable.    SPLEEN: Unremarkable.    PANCREAS: Unremarkable.    ADRENAL GLANDS: Unremarkable.    KIDNEYS: Symmetric pattern of renal enhancement. No hydronephrosis   bilaterally. Bilateral renal cysts.    ABDOMINOPELVIC NODES: No lymphadenopathy.    PELVIC ORGANS: Unremarkable.    PERITONEUM/MESENTERY/BOWEL: No bowel obstruction. Mesenteric edema.    BONES/SOFT TISSUES: Degenerative changes spine. Chronic L1 vertebral body   compression deformity. Age-indeterminate L3 vertebral body compression   deformity; likely chronic.    IMPRESSION:   1. Marked aneurysmal dilation of ascending thoracic aorta, 7.4 x 7 cm.   Aortic arch dilated up to 4.2 cm, descending thoracic aorta up to 3.5 cm.   2. No definite evidence of acute traumatic intrathoracic or   intra-abdominal pathology.  3. Chronic L1 vertebral body compression deformity. Age-indeterminate L3   vertebral body compression deformity; likely chronic.  4. Left upper lobe 0.3 cm pulmonary nodule. Per Fleischner Society 2017   guidelines, no further recommendations provided in a low risk patient,   and consider optional CT chest in 12 months in a high risk patient   (history of smoking).    ARIEL VELIZ, ATTENDING RADIOLOGIST  This document has been electronically signed. Jan 28 2019  1:54    Echo:EXAM:  2-D ECHO (TTE) COMPLETE      PROCEDURE DATE:  05/06/2018      INTERPRETATION:  REPORT:    TRANSTHORACIC ECHOCARDIOGRAM REPORT         Patient Name:   LORI LEWIS Accession #: [id number]  Medical Rec #:  [id number]    Height:      0.0 in 0.0 cm  YOB: 1925    Weight:      0.0 lb 0.00 kg  Patient Age:    89 years    BSA:         0.00 m²  Patient Gender: F           BP:          121/58 mmHg       Date of Exam:        5/6/2018 9:29:31 AM  Referring Physician: [id number] NICHOLAS FERMIN  Sonographer:         Kaylee Huffman  Reading Physician:   Jerry Hernandez M.D.    Procedure:   2D Echo/Doppler/Color Doppler Complete.  Indications: R07.9 - Chest Pain, unspecified  Diagnosis:   Unspecified diastolic (congestive) heart failure - I50.30     Summary:   1. Left ventricular ejection fraction, by visual estimation, is 30 to   35%.   2. Severely decreased global left ventricular systolic function.   3. Spectral Doppler shows impaired relaxation pattern of left   ventricular myocardial filling (Grade I diastolic dysfunction).   4. Thickening and calcification of the anterior and posterior mitral   valve leaflets.   5. Moderate tricuspid regurgitation.   6. Mild aortic regurgitation.   7. There is moderate aortic root calcification.    PHYSICIAN INTERPRETATION:  Left Ventricle: Global LV systolic function was severely decreased. Left   ventricular ejection fraction, by visual estimation, is 30 to 35%.   Spectral Doppler shows impaired relaxation pattern of left ventricular   myocardial filling (Grade I diastolic dysfunction).  Right Ventricle: Normal right ventricular size and function.  Left Atrium: Mildly enlarged left atrium.  Right Atrium: Normal right atrial size.  Pericardium: There is no evidence of pericardial effusion. There is a   small pleural effusion in the right lateral region.  Mitral Valve: Structurally normal mitral valve, with normal leaflet   excursion. Thickening and calcification of the anterior and posterior   mitral valve leaflets. Mitral leaflet mobility is normal. Mild mitral   valve regurgitation is seen.  Tricuspid Valve: Structurally normal tricuspid valve, with normal leaflet   excursion. Moderate tricuspid regurgitation is visualized.  Aortic Valve: The aortic valve is trileaflet. Mild aortic valve   regurgitation is seen. Sclerotic.  Pulmonic Valve: The pulmonic valve is normal. Trace pulmonic valve   regurgitation.  Aorta: The aortic root is normal in size and structure. There is moderate   aortic root calcification.  Pulmonary Artery: The main pulmonary artery is normal in size.  Venous: The inferior vena cava was normal sized, with respiratory size   variation greater than 50%.  Additional Comments: A pacer wire is visualized in the right ventricle.       2D AND M-MODE MEASUREMENTS (normal ranges within parentheses):  Left Ventricle:                  Normal   Aorta/Left Atrium:            Normal  IVSd (2D):              1.00 cm (0.7-1.1) Left Atrium (2D):  4.08 cm   (1.9-4.0)  LVPWd (2D):             1.11 cm (0.7-1.1)  LVIDd (2D):             5.00 cm (3.4-5.7)  LVIDs (2D):             4.49 cm  LV FS (2D):             10.2 %   (>25%)  IVSd (Mmode):           1.15 cm (0.7-1.1)  LVPWd (Mmode):          0.93 cm (0.7-1.1)  LVIDd (Mmode):          6.57 cm (3.4-5.7)  LVIDs (Mmode):          5.28 cm  LV FS (Mmode):          19.7 %   (>25%)  Relative Wall Thickness  0.44    (<0.42)  Rel. Wall Thickness Mm   0.28    (<0.42)    SPECTRAL DOPPLER ANALYSIS:  LV DIASTOLIC FUNCTION:  MV Peak E: 0.31 m/s Decel Time: 133 msec  MV Peak A: 0.84 m/s  E/A Ratio: 0.36    Aortic Valve:  AoV VMax:    1.65 m/s  AoV Area, Vmax:    1.43 cm² Vmax Indx:  AoV VTI:     0.38 m    AoV Area, VTI:     1.27 cm² VTI Indx:  AoV Pk Grad: 10.9 mmHg AoV Area, Mn Grad: 1.27 cm² Mn Grad Indx:  AoV Mn Grad: 7.0 mmHg    LVOT Vmax: 0.88 m/s  LVOT VTI:  0.18 m  LVOT Diam: 1.85 cm    Aortic Insufficiency:  AI Half-time:  482 msec  AI Decel Rate: 2.20 m/s²    Mitral Valve:  MV P1/2 Time: 38.64 msec  MV Area, PHT: 5.69 cm²    Tricuspid Valve and PA/RV Systolic Pressure: TR Max Velocity: 2.42 m/s RA   Pressure: 10 mmHg RVSP/PASP: 33.4 mmHg       [id number] Jerry Hernandez M.D., Electronically signed on 5/6/2018 at 11:32:06   AM      EXAM:  2-D ECHO (TTE) COMPLETE        PROCEDURE DATE:  05/06/2018      INTERPRETATION:  REPORT:    TRANSTHORACIC ECHOCARDIOGRAM REPORT     Patient Name:   LORI LEWIS Accession #: [id number]  Medical Rec #:  [id number]    Height:      0.0 in 0.0 cm  YOB: 1925    Weight:      0.0 lb 0.00 kg  Patient Age:    89 years    BSA:         0.00 m²  Patient Gender: F           BP:          121/58 mmHg       Date of Exam:        5/6/2018 9:29:31 AM  Referring Physician: [id number] [facility]  Sonographer:         Kaylee Huffman  Reading Physician:   Jerry Hernandez M.D.    Procedure:   2D Echo/Doppler/Color Doppler Complete.  Indications: R07.9 - Chest Pain, unspecified  Diagnosis:   Unspecified diastolic (congestive) heart failure - I50.30      Summary:   1. Left ventricular ejection fraction, by visual estimation, is 30 to   35%.   2. Severely decreased global left ventricular systolic function.   3. Spectral Doppler shows impaired relaxation pattern of left   ventricular myocardial filling (Grade I diastolic dysfunction).   4. Thickening and calcification of the anterior and posterior mitral   valve leaflets.   5. Moderate tricuspid regurgitation.   6. Mild aortic regurgitation.   7. There is moderate aortic root calcification.    PHYSICIAN INTERPRETATION:  Left Ventricle: Global LV systolic function was severely decreased. Left   ventricular ejection fraction, by visual estimation, is 30 to 35%.   Spectral Doppler shows impaired relaxation pattern of left ventricular   myocardial filling (Grade I diastolic dysfunction).  Right Ventricle: Normal right ventricular size and function.  Left Atrium: Mildly enlarged left atrium.  Right Atrium: Normal right atrial size.  Pericardium: There is no evidence of pericardial effusion. There is a   small pleural effusion in the right lateral region.  Mitral Valve: Structurally normal mitral valve, with normal leaflet   excursion. Thickening and calcification of the anterior and posterior   mitral valve leaflets. Mitral leaflet mobility is normal. Mild mitral   valve regurgitation is seen.  Tricuspid Valve: Structurally normal tricuspid valve, with normal leaflet   excursion. Moderate tricuspid regurgitation is visualized.  Aortic Valve: The aortic valve is trileaflet. Mild aortic valve   regurgitation is seen. Sclerotic.  Pulmonic Valve: The pulmonic valve is normal. Trace pulmonic valve   regurgitation.  Aorta: The aortic root is normal in size and structure. There is moderate   aortic root calcification.  Pulmonary Artery: The main pulmonary artery is normal in size.  Venous: The inferior vena cava was normal sized, with respiratory size   variation greater than 50%.  Additional Comments: A pacer wire is visualized in the right ventricle.       2D AND M-MODE MEASUREMENTS (normal ranges within parentheses):  Left Ventricle:                  Normal   Aorta/Left Atrium:            Normal  IVSd (2D):              1.00 cm (0.7-1.1) Left Atrium (2D):  4.08 cm   (1.9-4.0)  LVPWd (2D):             1.11 cm (0.7-1.1)  LVIDd (2D):             5.00 cm (3.4-5.7)  LVIDs (2D):             4.49 cm  LV FS (2D):             10.2 %   (>25%)  IVSd (Mmode):           1.15 cm (0.7-1.1)  LVPWd (Mmode):          0.93 cm (0.7-1.1)  LVIDd (Mmode):          6.57 cm (3.4-5.7)  LVIDs (Mmode):          5.28 cm  LV FS (Mmode):          19.7 %   (>25%)  Relative Wall Thickness  0.44    (<0.42)  Rel. Wall Thickness Mm   0.28    (<0.42)    SPECTRAL DOPPLER ANALYSIS:  LV DIASTOLIC FUNCTION:  MV Peak E: 0.31 m/s Decel Time: 133 msec  MV Peak A: 0.84 m/s  E/A Ratio: 0.36    Aortic Valve:  AoV VMax:    1.65 m/s  AoV Area, Vmax:    1.43 cm² Vmax Indx:  AoV VTI:     0.38 m    AoV Area, VTI:     1.27 cm² VTI Indx:  AoV Pk Grad: 10.9 mmHg AoV Area, Mn Grad: 1.27 cm² Mn Grad Indx:  AoV Mn Grad: 7.0 mmHg    LVOT Vmax: 0.88 m/s  LVOT VTI:  0.18 m  LVOT Diam: 1.85 cm    Aortic Insufficiency:  AI Half-time:  482 msec  AI Decel Rate: 2.20 m/s²    Mitral Valve:  MV P1/2 Time: 38.64 msec  MV Area, PHT: 5.69 cm²    Tricuspid Valve and PA/RV Systolic Pressure: TR Max Velocity: 2.42 m/s RA   Pressure: 10 mmHg RVSP/PASP: 33.4 mmHg       [id number] Jerry Hernandez M.D., Electronically signed on 5/6/2018 at 11:32:06   AM       Recommendation: (Procedures/Evaluations)  CT HEAD Non-Contrast:[  ]  CT Chest with /without contrast [ ]  Carotid Duplex :[ ]  KIKE/PVR: [ ]  PFT : Simple PFT [ ]  Full [ ]  Renal Consult [ ]  Pulmonary Consult: [ ]   Vascular Consult [ ]    Dental Consult [ ]   Hem-Onc Consult [ ]   GI Consult [ ]   Other Consultations :    STS Score:     Impression:    CAD [ ]  Valvular  disease [ ]   Aortic Disease [ ]   BENTON: Yes[ ] No [ ]   CKD Stage I [ ] , Stage II [ ] , Stage III [ ], Stage IV [ ]   Anemia: Yes [ ], No [ ]  Diabetes :Yes [ ], No [ ]  Acute MI: Yes [ ], No [ ]   Heart Failure: Yes [ ] , No [ ] HFpEF [ ], HFrEF [ ]        Assessment/ Plan:  Patient is a 89 yo female pre-op for Surgeon: /Lor/ Monica    Consult requesting by: ED attending    HISTORY OF PRESENT ILLNESS:  89F with PMH significant for urinary incontinence, PUD, CKD, thoracic aortic aneurysm repaired in 2008, HLD, HTN, and OA who presented to the ED s/p fall from standing.  Patient states she does not remember the fall or the mechanism of the fall and is unsure if she lost consciousness.  EMS reported that she was confused and was found to have an ascending aortic aneurysm on CT scan measuring 7.4x7cm.  Pt resides in nursing home and is poor historian due to dementia.    NYHA functional class  -- unable to ascertain due to pt being poor historian  [ ] Class I (no limitation) [ ] Class II (slight limitation) [ ] Class III (marked limitation) [ ] Class IV (symptoms at rest)    PAST MEDICAL & SURGICAL HISTORY:  Pacemaker  Urinary incontinence  PUD (peptic ulcer disease)  Thoracic aortic aneurysm without rupture  CKD (chronic kidney disease)  HLD (hyperlipidemia)  Afib  Osteoarthritis  Hypothyroid  HTN (hypertension)  H/O exploratory laparotomy: 1960s  History of thoracic aortic aneurysm repair: 2007 at [facility] by Barry Anderson and Catalina  dementia    HOME MEDICATIONS  (STANDING):Acidophilus oral tablet: 1 tab(s) orally once a day (05 May 2018 11:01)  amiodarone 100 mg oral tablet: 1 tab(s) orally once a day (05 May 2018 11:01)  aspirin 81 mg oral tablet: 1 tab(s) orally once a day (05 May 2018 11:01)  clonazePAM 0.5 mg oral tablet: orally once a day (05 May 2018 11:01)  Colace 100 mg oral capsule: 1 cap(s) orally 2 times a day (05 May 2018 11:01)  Daily Vit E oral tablet: 1 tab(s) orally once a day (05 May 2018 11:01)  furosemide 20 mg oral tablet: 1 tab(s) orally once a day (05 May 2018 11:01)  labetalol 100 mg oral tablet: 1 tab(s) orally 2 times a day (05 May 2018 11:01)  levothyroxine 50 mcg (0.05 mg) oral tablet: 1 tab(s) orally once a day (05 May 2018 11:01)  Mapap 500 mg oral capsule:  (05 May 2018 11:01)  oxybutynin 5 mg/24 hours oral tablet, extended release: 1 tab(s) orally once a day (05 May 2018 11:01)  pravastatin 40 mg oral tablet: 1 tab(s) orally once a day (05 May 2018 11:01)  valsartan 320 mg oral tablet: 1 tab(s) orally once a day (05 May 2018 11:01)        Allergies    ciprofloxacin (Other)  morphine (Unknown)  oxycodone (Unknown)    Intolerances        SOCIAL HISTORY:  Smoker: [ ] Yes  [x ] No        PACK YEARS:                         WHEN QUIT?  ETOH use: [ ] Yes  [x ] No              FREQUENCY / QUANTITY:  Ilicit Drug use:  [ ] Yes  [ ] No  Occupation: retired   Lives in facility        Review of Systems--unable to ascertain due to pt being poor historian  CONSTITUTIONAL:  Fevers[ ] chills[ ] sweats[ ] fatigue[ ] weight loss[ ] weight gain [ ]                                     NEGATIVE [X ]   NEURO:  parathesias[ ] seizures [ ]  syncope [ ]  confusion [ ]                                                                                NEGATIVE[ X]   EYES: glasses[ ]  blurry vision[ ]  discharge[ ] pain[ ] glaucoma [ ]                                                                          NEGATIVE[X ]   ENMT:  difficulty hearing [ ]  vertigo[ ]  dysphagia[ ] epistaxis[ ] recent dental work [ ]                                    NEGATIVE[ X]   CV:  chest pain[ ] palpitations[ ] ODONNELL [ ] diaphoresis [ ]                                                                                           NEGATIVE[ X]   RESPIRATORY:  wheezing[ ] SOB[ ] cough [ ] sputum[ ] hemoptysis[ ]                                                                  NEGATIVE[ ]   GI:  nausea[ ]  vommiting [ ]  diarrhea[ ] constipation [ ] melena [ ]                                                                      NEGATIVE[ X]   : hematuria[ ]  dysuria[ ] urgency[ ] incontinence[ ]                                                                                            NEGATIVE[ X]   MUSKULOSKELETAL:  arthritis[ ]  joint swelling [ ] muscle weakness [ ] Hx vein stripping [ ]                             NEGATIVE[X ]   SKIN/BREAST:  rash[ ] itching [ ]  hair loss[ ] masses[ ]                                                                                              NEGATIVE[ X]   PSYCH:  dementia [ ] depresion [ ] anxiety[ ]                                                                                                               NEGATIVE[X ]   HEME/LYMPH:  bruises easily[ ] enlarged lymph nodes[ ] tender lymph nodes[ ]                                               NEGATIVE[ X]   ENDOCRINE:  cold intolerance[ ] heat intolerance[ ] polydipsia[ ]                                                                          NEGATIVE[ X]     PHYSICAL EXAM  Vital Signs Last 24 Hrs  T(C): 36.7 (28 Jan 2019 12:33), Max: 36.7 (28 Jan 2019 12:33)  T(F): 98 (28 Jan 2019 12:33), Max: 98 (28 Jan 2019 12:33)  HR: 71 (28 Jan 2019 12:33) (71 - 71)  BP: 132/99 (28 Jan 2019 12:33) (132/99 - 132/99)  BP(mean): --  RR: 20 (28 Jan 2019 12:33) (20 - 20)  SpO2: 98% (28 Jan 2019 12:33) (98% - 98%)  Right arm bp: Left arm bp;    CONSTITUTIONAL:                                                                          WNL[ ]   Neuro: WNL[ ] Normal exam oriented to person/place & time with no focal motor or sensory  deficits. Other -- pt is only oriented to person and not to place or time                  Eyes: WNL[ ]   Normal exam of conjunctiva & lids, pupils equally reactive. Other     ENT: WNL[ ]    Normal exam of nasal/oral mucosa with absence of cyanosis. Other  Neck: WNL[ ]  Normal exam of jugular veins, trachea & thyroid. Other  Chest: WNL[ ] Normal lung exam with good air movement absence of wheezes, rales, or rhonchi: Other                                                                                CV:  Auscultation: normal [ ] S3[ ] S4[ ] Irregular [ ] Rub[ ] Clicks[ ]    Murmurs none:[ ]systolic [ ]  diastolic [ ] holosystolic [ ]  Carotids: No Bruits[ ] Other                 Abdominal Aorta: normal [ ] nonpalpable[ ]Other                                                                                      GI:           WNL[ ] Normal exam of abdomen, liver & spleen with no noted masses or tenderness. Other                                                                                                        Extremities: WNL[ ] Normal no evidence of cyanosis or deformity Edema: none[ ]trace[ ]1+[ ]2+[ ]3+[ ]4+[ ]  Lower Extremity Pulses: Right[ ] Left[ ]Varicosities[ ]  SKIN :WNL[ ] Normal exam to inspection & palation. Other:                                                          LABS:                        11.1   3.26  )-----------( 89       ( 28 Jan 2019 12:42 )             34.1     01-28    142  |  102  |  17  ----------------------------<  86  4.9   |  26  |  1.2    Ca    9.2      28 Jan 2019 12:42    TPro  6.3  /  Alb  4.0  /  TBili  0.3  /  DBili  x   /  AST  26  /  ALT  14  /  AlkPhos  57  01-28    PT/INR - ( 28 Jan 2019 12:42 )   PT: 12.30 sec;   INR: 1.07 ratio         PTT - ( 28 Jan 2019 12:42 )  PTT:29.5 sec    CT of the chest:  EXAM:  CT ANGIO ABD PELV (W)AW IC            PROCEDURE DATE:  01/28/2019      INTERPRETATION:  CTA chest, abdomen and pelvis with IV contrast    CLINICAL HISTORY/REASON FOR EXAM: Trauma. Aortic aneurysm.    TECHNIQUE: Contiguous axial CTA images were obtained from the thoracic   inlet to the pubic symphysis following administration of Optiray 320   intravenous contrast. Arterial phase images obtained. Oral contrast was   not administered. Reformatted images in the coronal and sagittal planes   were acquired.    COMPARISON: CT abdomen and pelvis August 9, 2009, CT chest May 8, 2007.   Chest x-ray May 5, 2018.      FINDINGS:    CHEST:    LUNGS, PLEURA, AIRWAYS: Trace right pleural effusion. No pneumothorax.   Central airways patent    PULMONARY NODULES: Left upper lobe 0.3 cm pulmonary nodule (series 6,   image 59).    THORACIC NODES: No mediastinal, hilar, supraclavicular, or axillary   lymphadenopathy.    MEDIASTINUM/GREAT VESSELS: No pericardial effusion. Cardiomegaly. Marked   aneurysmal dilation of ascending thoracic aorta, 7.4 x 7 cm. Aortic arch   dilated up to 4.2 cm, descending thoracic aorta up to 3.5 cm.   Atherosclerotic mixed plaque of aorta and its major branches. Left pacing   device. Post median sternotomy.      ABDOMEN/PELVIS:    HEPATOBILIARY: Unremarkable.    SPLEEN: Unremarkable.    PANCREAS: Unremarkable.    ADRENAL GLANDS: Unremarkable.    KIDNEYS: Symmetric pattern of renal enhancement. No hydronephrosis   bilaterally. Bilateral renal cysts.    ABDOMINOPELVIC NODES: No lymphadenopathy.    PELVIC ORGANS: Unremarkable.    PERITONEUM/MESENTERY/BOWEL: No bowel obstruction. Mesenteric edema.    BONES/SOFT TISSUES: Degenerative changes spine. Chronic L1 vertebral body   compression deformity. Age-indeterminate L3 vertebral body compression   deformity; likely chronic.    IMPRESSION:   1. Marked aneurysmal dilation of ascending thoracic aorta, 7.4 x 7 cm.   Aortic arch dilated up to 4.2 cm, descending thoracic aorta up to 3.5 cm.   2. No definite evidence of acute traumatic intrathoracic or   intra-abdominal pathology.  3. Chronic L1 vertebral body compression deformity. Age-indeterminate L3   vertebral body compression deformity; likely chronic.  4. Left upper lobe 0.3 cm pulmonary nodule. Per Fleischner Society 2017   guidelines, no further recommendations provided in a low risk patient,   and consider optional CT chest in 12 months in a high risk patient   (history of smoking).    ARIEL VELIZ, ATTENDING RADIOLOGIST  This document has been electronically signed. Jan 28 2019  1:54    Echo:EXAM:  2-D ECHO (TTE) COMPLETE      PROCEDURE DATE:  05/06/2018      INTERPRETATION:  REPORT:    TRANSTHORACIC ECHOCARDIOGRAM REPORT         Patient Name:   LORI LEWIS Accession #: [id number]  Medical Rec #:  [id number]    Height:      0.0 in 0.0 cm  YOB: 1925    Weight:      0.0 lb 0.00 kg  Patient Age:    89 years    BSA:         0.00 m²  Patient Gender: F           BP:          121/58 mmHg       Date of Exam:        5/6/2018 9:29:31 AM  Referring Physician: [id number] [facility] JENY  Sonographer:         Kaylee Huffman  Reading Physician:   Jerry Hernandez M.D.    Procedure:   2D Echo/Doppler/Color Doppler Complete.  Indications: R07.9 - Chest Pain, unspecified  Diagnosis:   Unspecified diastolic (congestive) heart failure - I50.30     Summary:   1. Left ventricular ejection fraction, by visual estimation, is 30 to   35%.   2. Severely decreased global left ventricular systolic function.   3. Spectral Doppler shows impaired relaxation pattern of left   ventricular myocardial filling (Grade I diastolic dysfunction).   4. Thickening and calcification of the anterior and posterior mitral   valve leaflets.   5. Moderate tricuspid regurgitation.   6. Mild aortic regurgitation.   7. There is moderate aortic root calcification.    PHYSICIAN INTERPRETATION:  Left Ventricle: Global LV systolic function was severely decreased. Left   ventricular ejection fraction, by visual estimation, is 30 to 35%.   Spectral Doppler shows impaired relaxation pattern of left ventricular   myocardial filling (Grade I diastolic dysfunction).  Right Ventricle: Normal right ventricular size and function.  Left Atrium: Mildly enlarged left atrium.  Right Atrium: Normal right atrial size.  Pericardium: There is no evidence of pericardial effusion. There is a   small pleural effusion in the right lateral region.  Mitral Valve: Structurally normal mitral valve, with normal leaflet   excursion. Thickening and calcification of the anterior and posterior   mitral valve leaflets. Mitral leaflet mobility is normal. Mild mitral   valve regurgitation is seen.  Tricuspid Valve: Structurally normal tricuspid valve, with normal leaflet   excursion. Moderate tricuspid regurgitation is visualized.  Aortic Valve: The aortic valve is trileaflet. Mild aortic valve   regurgitation is seen. Sclerotic.  Pulmonic Valve: The pulmonic valve is normal. Trace pulmonic valve   regurgitation.  Aorta: The aortic root is normal in size and structure. There is moderate   aortic root calcification.  Pulmonary Artery: The main pulmonary artery is normal in size.  Venous: The inferior vena cava was normal sized, with respiratory size   variation greater than 50%.  Additional Comments: A pacer wire is visualized in the right ventricle.       2D AND M-MODE MEASUREMENTS (normal ranges within parentheses):  Left Ventricle:                  Normal   Aorta/Left Atrium:            Normal  IVSd (2D):              1.00 cm (0.7-1.1) Left Atrium (2D):  4.08 cm   (1.9-4.0)  LVPWd (2D):             1.11 cm (0.7-1.1)  LVIDd (2D):             5.00 cm (3.4-5.7)  LVIDs (2D):             4.49 cm  LV FS (2D):             10.2 %   (>25%)  IVSd (Mmode):           1.15 cm (0.7-1.1)  LVPWd (Mmode):          0.93 cm (0.7-1.1)  LVIDd (Mmode):          6.57 cm (3.4-5.7)  LVIDs (Mmode):          5.28 cm  LV FS (Mmode):          19.7 %   (>25%)  Relative Wall Thickness  0.44    (<0.42)  Rel. Wall Thickness Mm   0.28    (<0.42)    SPECTRAL DOPPLER ANALYSIS:  LV DIASTOLIC FUNCTION:  MV Peak E: 0.31 m/s Decel Time: 133 msec  MV Peak A: 0.84 m/s  E/A Ratio: 0.36    Aortic Valve:  AoV VMax:    1.65 m/s  AoV Area, Vmax:    1.43 cm² Vmax Indx:  AoV VTI:     0.38 m    AoV Area, VTI:     1.27 cm² VTI Indx:  AoV Pk Grad: 10.9 mmHg AoV Area, Mn Grad: 1.27 cm² Mn Grad Indx:  AoV Mn Grad: 7.0 mmHg    LVOT Vmax: 0.88 m/s  LVOT VTI:  0.18 m  LVOT Diam: 1.85 cm    Aortic Insufficiency:  AI Half-time:  482 msec  AI Decel Rate: 2.20 m/s²    Mitral Valve:  MV P1/2 Time: 38.64 msec  MV Area, PHT: 5.69 cm²    Tricuspid Valve and PA/RV Systolic Pressure: TR Max Velocity: 2.42 m/s RA   Pressure: 10 mmHg RVSP/PASP: 33.4 mmHg       [id number] Jerry Hernandez M.D., Electronically signed on 5/6/2018 at 11:32:06   AM      EXAM:  2-D ECHO (TTE) COMPLETE        PROCEDURE DATE:  05/06/2018      INTERPRETATION:  REPORT:    TRANSTHORACIC ECHOCARDIOGRAM REPORT     Patient Name:   LORI LEWIS Accession #: [id number]  Medical Rec #:  [id number]    Height:      0.0 in 0.0 cm  YOB: 1925    Weight:      0.0 lb 0.00 kg  Patient Age:    89 years    BSA:         0.00 m²  Patient Gender: F           BP:          121/58 mmHg       Date of Exam:        5/6/2018 9:29:31 AM  Referring Physician: [id number] NICHOLAS JENY  Sonographer:         Kaylee Huffman  Reading Physician:   Jerry Hernandez M.D.    Procedure:   2D Echo/Doppler/Color Doppler Complete.  Indications: R07.9 - Chest Pain, unspecified  Diagnosis:   Unspecified diastolic (congestive) heart failure - I50.30      Summary:   1. Left ventricular ejection fraction, by visual estimation, is 30 to   35%.   2. Severely decreased global left ventricular systolic function.   3. Spectral Doppler shows impaired relaxation pattern of left   ventricular myocardial filling (Grade I diastolic dysfunction).   4. Thickening and calcification of the anterior and posterior mitral   valve leaflets.   5. Moderate tricuspid regurgitation.   6. Mild aortic regurgitation.   7. There is moderate aortic root calcification.    PHYSICIAN INTERPRETATION:  Left Ventricle: Global LV systolic function was severely decreased. Left   ventricular ejection fraction, by visual estimation, is 30 to 35%.   Spectral Doppler shows impaired relaxation pattern of left ventricular   myocardial filling (Grade I diastolic dysfunction).  Right Ventricle: Normal right ventricular size and function.  Left Atrium: Mildly enlarged left atrium.  Right Atrium: Normal right atrial size.  Pericardium: There is no evidence of pericardial effusion. There is a   small pleural effusion in the right lateral region.  Mitral Valve: Structurally normal mitral valve, with normal leaflet   excursion. Thickening and calcification of the anterior and posterior   mitral valve leaflets. Mitral leaflet mobility is normal. Mild mitral   valve regurgitation is seen.  Tricuspid Valve: Structurally normal tricuspid valve, with normal leaflet   excursion. Moderate tricuspid regurgitation is visualized.  Aortic Valve: The aortic valve is trileaflet. Mild aortic valve   regurgitation is seen. Sclerotic.  Pulmonic Valve: The pulmonic valve is normal. Trace pulmonic valve   regurgitation.  Aorta: The aortic root is normal in size and structure. There is moderate   aortic root calcification.  Pulmonary Artery: The main pulmonary artery is normal in size.  Venous: The inferior vena cava was normal sized, with respiratory size   variation greater than 50%.  Additional Comments: A pacer wire is visualized in the right ventricle.       2D AND M-MODE MEASUREMENTS (normal ranges within parentheses):  Left Ventricle:                  Normal   Aorta/Left Atrium:            Normal  IVSd (2D):              1.00 cm (0.7-1.1) Left Atrium (2D):  4.08 cm   (1.9-4.0)  LVPWd (2D):             1.11 cm (0.7-1.1)  LVIDd (2D):             5.00 cm (3.4-5.7)  LVIDs (2D):             4.49 cm  LV FS (2D):             10.2 %   (>25%)  IVSd (Mmode):           1.15 cm (0.7-1.1)  LVPWd (Mmode):          0.93 cm (0.7-1.1)  LVIDd (Mmode):          6.57 cm (3.4-5.7)  LVIDs (Mmode):          5.28 cm  LV FS (Mmode):          19.7 %   (>25%)  Relative Wall Thickness  0.44    (<0.42)  Rel. Wall Thickness Mm   0.28    (<0.42)    SPECTRAL DOPPLER ANALYSIS:  LV DIASTOLIC FUNCTION:  MV Peak E: 0.31 m/s Decel Time: 133 msec  MV Peak A: 0.84 m/s  E/A Ratio: 0.36    Aortic Valve:  AoV VMax:    1.65 m/s  AoV Area, Vmax:    1.43 cm² Vmax Indx:  AoV VTI:     0.38 m    AoV Area, VTI:     1.27 cm² VTI Indx:  AoV Pk Grad: 10.9 mmHg AoV Area, Mn Grad: 1.27 cm² Mn Grad Indx:  AoV Mn Grad: 7.0 mmHg    LVOT Vmax: 0.88 m/s  LVOT VTI:  0.18 m  LVOT Diam: 1.85 cm    Aortic Insufficiency:  AI Half-time:  482 msec  AI Decel Rate: 2.20 m/s²    Mitral Valve:  MV P1/2 Time: 38.64 msec  MV Area, PHT: 5.69 cm²    Tricuspid Valve and PA/RV Systolic Pressure: TR Max Velocity: 2.42 m/s RA   Pressure: 10 mmHg RVSP/PASP: 33.4 mmHg       LANIE Hernandez M.D., Electronically signed on 5/6/2018 at 11:32:06   AM       Impression:  Pt is a  89 yo female, who had prior aortic aneurysm repair in 2007, with dementia who was found to have an ascending aortic aneurysm on CT scan measuring [id number] who is s/p fall  case was discussed with multiple CT surgeons, who also reviewed her CT scan -- surgeon note to follow.  Based on her advanced age and dementia, surgery is not recommended.  cont present tx and d/c back to facility as per medicine    CAD [ ]  Valvular  disease [ ]   Aortic Disease [ ]   [place]: Yes[ ] No [ ]   CKD Stage I [ ] , Stage II [ ] , Stage III [ ], Stage IV [ ]   Anemia: Yes [ ], No [ ]  Diabetes :Yes [ ], No [ ]  Acute MI: Yes [ ], No [ ]   Heart Failure: Yes [ ] , No [ ] HFpEF [ ], HFrEF [ ]        Assessment/ Plan:  Patient is a 89 yo female pre-op for

## 2019-01-28 NOTE — ED ADULT NURSE NOTE - OBJECTIVE STATEMENT
s/p fall, sustained laceration to left forehead, no LOC, pt is on aspirin, abrasion noted to left knee, pt also has dressing to right lower ext for treatment of fluid filled blister.

## 2019-01-28 NOTE — CONSULT NOTE ADULT - SUBJECTIVE AND OBJECTIVE BOX
TRAUMA ACTIVATION LEVEL: Trauma Alert    MECHANISM OF INJURY:      [x] Blunt  	[] MVC	[x] Fall	[] Pedestrian Struck	[] Motorcycle   [] Assault   [] Bicycle collision  [] Sports injury     [] Penetrating  	[] Gun Shot Wound 		[] Stab Wound    GCS: 	E: 4	V: 5	M: 6    89y old f s/p fall from standing, +HT, ?LOC, on aspirin  HPI: 89F with PMH significant for urinary incontinence, PUD, CKD, thoracic aortic aneurysm repaired in 2008, HLD, HTN, OA presented to the ED s/p fall from standing.  Patient states she does not remember the fall or the mechanism of the fall and is unsure if she lost consciousness.  EMS reported that she was confused in the ambulance, however on arrival and in the trauma bay the patient was AOx3 with GCS 15.  Patient complains of pain over left forehead, denies fever, chills, chest pain, abdominal pain, nausea, vomiting, headache, dizziness, shortness of breath.    PAST MEDICAL & SURGICAL HISTORY:  Urinary incontinence  PUD (peptic ulcer disease)  Thoracic aortic aneurysm without rupture  CKD (chronic kidney disease)  HLD (hyperlipidemia)  Afib  Osteoarthritis  Hypothyroid  HTN (hypertension)  H/O exploratory laparotomy: 1960s  History of thoracic aortic aneurysm repair: 2008      Allergies    ciprofloxacin (Other)  morphine (Unknown)  oxycodone (Unknown)    Intolerances    Home Medications:  Acidophilus oral tablet: 1 tab(s) orally once a day (05 May 2018 11:01)  amiodarone 100 mg oral tablet: 1 tab(s) orally once a day (05 May 2018 11:01)  aspirin 81 mg oral tablet: 1 tab(s) orally once a day (05 May 2018 11:01)  clonazePAM 0.5 mg oral tablet: orally once a day (05 May 2018 11:01)  Colace 100 mg oral capsule: 1 cap(s) orally 2 times a day (05 May 2018 11:01)  Daily Kyle oral tablet: 1 tab(s) orally once a day (05 May 2018 11:01)  furosemide 20 mg oral tablet: 1 tab(s) orally once a day (05 May 2018 11:01)  labetalol 100 mg oral tablet: 1 tab(s) orally 2 times a day (05 May 2018 11:01)  levothyroxine 50 mcg (0.05 mg) oral tablet: 1 tab(s) orally once a day (05 May 2018 11:01)  Mapap 500 mg oral capsule:  (05 May 2018 11:01)  oxybutynin 5 mg/24 hours oral tablet, extended release: 1 tab(s) orally once a day (05 May 2018 11:01)  pravastatin 40 mg oral tablet: 1 tab(s) orally once a day (05 May 2018 11:01)  valsartan 320 mg oral tablet: 1 tab(s) orally once a day (05 May 2018 11:01)      ROS: 10-system review is otherwise negative except HPI above.      Primary Survey:    A - airway intact  B - bilateral breath sounds and good chest rise  C - palpable pulses in all extremities  D - GCS 15 on arrival, REYNOSO  Exposure obtained    Vital Signs Last 24 Hrs  T(C): 36.7 (28 Jan 2019 12:33), Max: 36.7 (28 Jan 2019 12:33)  T(F): 98 (28 Jan 2019 12:33), Max: 98 (28 Jan 2019 12:33)  HR: 71 (28 Jan 2019 12:33) (71 - 71)  BP: 132/99 (28 Jan 2019 12:33) (132/99 - 132/99)  RR: 20 (28 Jan 2019 12:33) (20 - 20)  SpO2: 98% (28 Jan 2019 12:33) (98% - 98%)    Secondary Survey:   General: NAD  HEENT: Normocephalic, abrasion present over left forehead   Neck: Soft, midline trachea. no cspine tenderness  Chest: No chest wall tenderness. or subq  emphysema   Cardiac: S1, S2, RRR  Respiratory: Bilateral breath sounds, clear and equal bilaterally  Abdomen: Soft, non-distended, non-tender, no rebound,   Groin: Normal appearing, pelvis stable   Ext: palp radial b/l UE, b/l DP palp in Lower Extrem, abrasion over left knee, venous stasis changes over BL LEs    LABS:  CAPILLARY BLOOD GLUCOSE      POCT Blood Glucose.: 103 mg/dL (28 Jan 2019 12:38)                  LFTs:         Coags:                        RADIOLOGY & ADDITIONAL STUDIES:    Pending TRAUMA ACTIVATION LEVEL: Trauma Alert    MECHANISM OF INJURY:      [x] Blunt  	[] MVC	[x] Fall	[] Pedestrian Struck	[] Motorcycle   [] Assault   [] Bicycle collision  [] Sports injury     [] Penetrating  	[] Gun Shot Wound 		[] Stab Wound    GCS: 	E: 4	V: 5	M: 6    89y old f s/p fall from standing, +HT, ?LOC, on aspirin  HPI: 89F with PMH significant for urinary incontinence, PUD, CKD, thoracic aortic aneurysm repaired in 2008, HLD, HTN, OA presented to the ED s/p fall from standing.  Patient states she does not remember the fall or the mechanism of the fall and is unsure if she lost consciousness.  EMS reported that she was confused in the ambulance, however on arrival and in the trauma bay the patient was AOx3 with GCS 15.  Patient complains of pain over left forehead, denies fever, chills, chest pain, abdominal pain, nausea, vomiting, headache, dizziness, shortness of breath.    PAST MEDICAL & SURGICAL HISTORY:  Urinary incontinence  PUD (peptic ulcer disease)  Thoracic aortic aneurysm without rupture  CKD (chronic kidney disease)  HLD (hyperlipidemia)  Afib  Osteoarthritis  Hypothyroid  HTN (hypertension)  H/O exploratory laparotomy: 1960s  History of thoracic aortic aneurysm repair: 2008      Allergies    ciprofloxacin (Other)  morphine (Unknown)  oxycodone (Unknown)    Intolerances    Home Medications:  Acidophilus oral tablet: 1 tab(s) orally once a day (05 May 2018 11:01)  amiodarone 100 mg oral tablet: 1 tab(s) orally once a day (05 May 2018 11:01)  aspirin 81 mg oral tablet: 1 tab(s) orally once a day (05 May 2018 11:01)  clonazePAM 0.5 mg oral tablet: orally once a day (05 May 2018 11:01)  Colace 100 mg oral capsule: 1 cap(s) orally 2 times a day (05 May 2018 11:01)  Daily Kyle oral tablet: 1 tab(s) orally once a day (05 May 2018 11:01)  furosemide 20 mg oral tablet: 1 tab(s) orally once a day (05 May 2018 11:01)  labetalol 100 mg oral tablet: 1 tab(s) orally 2 times a day (05 May 2018 11:01)  levothyroxine 50 mcg (0.05 mg) oral tablet: 1 tab(s) orally once a day (05 May 2018 11:01)  Mapap 500 mg oral capsule:  (05 May 2018 11:01)  oxybutynin 5 mg/24 hours oral tablet, extended release: 1 tab(s) orally once a day (05 May 2018 11:01)  pravastatin 40 mg oral tablet: 1 tab(s) orally once a day (05 May 2018 11:01)  valsartan 320 mg oral tablet: 1 tab(s) orally once a day (05 May 2018 11:01)      ROS: 10-system review is otherwise negative except HPI above.      Primary Survey:    A - airway intact  B - bilateral breath sounds and good chest rise  C - palpable pulses in all extremities  D - GCS 15 on arrival, REYNOSO  Exposure obtained    Vital Signs Last 24 Hrs  T(C): 36.7 (28 Jan 2019 12:33), Max: 36.7 (28 Jan 2019 12:33)  T(F): 98 (28 Jan 2019 12:33), Max: 98 (28 Jan 2019 12:33)  HR: 71 (28 Jan 2019 12:33) (71 - 71)  BP: 132/99 (28 Jan 2019 12:33) (132/99 - 132/99)  RR: 20 (28 Jan 2019 12:33) (20 - 20)  SpO2: 98% (28 Jan 2019 12:33) (98% - 98%)    Secondary Survey:   General: NAD  HEENT: Normocephalic, abrasion present over left forehead   Neck: Soft, midline trachea. no cspine tenderness  Chest: No chest wall tenderness. or subq  emphysema   Cardiac: S1, S2, RRR  Respiratory: Bilateral breath sounds, clear and equal bilaterally  Abdomen: Soft, non-distended, non-tender, no rebound,   Groin: Normal appearing, pelvis stable   Ext: palp radial b/l UE, b/l DP palp in Lower Extrem, abrasion over left knee, venous stasis changes over BL LEs    LABS:  CAPILLARY BLOOD GLUCOSE      POCT Blood Glucose.: 103 mg/dL (28 Jan 2019 12:38)                          11.1   3.26  )-----------( 89       ( 28 Jan 2019 12:42 )             34.1     01-28    142  |  102  |  17  ----------------------------<  86  4.9   |  26  |  1.2    Ca    9.2      28 Jan 2019 12:42    TPro  6.3  /  Alb  4.0  /  TBili  0.3  /  DBili  x   /  AST  26  /  ALT  14  /  AlkPhos  57  01-28    PT/INR - ( 28 Jan 2019 12:42 )   PT: 12.30 sec;   INR: 1.07 ratio         PTT - ( 28 Jan 2019 12:42 )  PTT:29.5 sec    RADIOLOGY & ADDITIONAL STUDIES:    < from: CT Head No Cont (01.28.19 @ 13:39) >  IMPRESSION:     1.  Periventricular and subcortical white matter chronic small vessel   ischemic changes.    2.  No acute fractures, mass effect, midline shift or hemorrhage.      < end of copied text >    < from: CT Angio Chest w/ IV Cont (01.28.19 @ 13:47) >  IMPRESSION:   1. Marked aneurysmal dilation of ascending thoracic aorta, 7.4 x 7 cm.   Aortic arch dilated up to 4.2 cm, descending thoracic aorta up to 3.5 cm.   2. No definite evidence of acute traumatic intrathoracic or   intra-abdominal pathology.  3. Chronic L1 vertebral body compression deformity. Age-indeterminate L3   vertebral body compression deformity; likely chronic.  4. Left upper lobe 0.3 cm pulmonary nodule. Per Fleischner Society 2017   guidelines, no further recommendations provided in a low risk patient,   and consider optional CT chest in 12 months in a high risk patient   (history of smoking).    < end of copied text >

## 2019-01-28 NOTE — ED PROVIDER NOTE - CARE PLAN
Principal Discharge DX:	Fall Principal Discharge DX:	Fall  Secondary Diagnosis:	Aortic aneurysm  Secondary Diagnosis:	Closed head injury

## 2019-01-28 NOTE — ED ADULT TRIAGE NOTE - CHIEF COMPLAINT QUOTE
patient fell from standing - hit head on floor on ASA - as per ems patient was alert and oriented upon transport. arrived to ed patient is disoriented. c collar in place . brusing and laceration noted to left fore head trauma alert called

## 2019-01-28 NOTE — ED PROCEDURE NOTE - ATTENDING CONTRIBUTION TO CARE
. I was present for and supervised the key critical aspects of the procedures performed during the care of the patient.. fast appears to be positive however ct abd pelvis negative. pt stable. no abd pain, isolated head injury however elderly pt

## 2019-01-28 NOTE — ED PROVIDER NOTE - CARE PROVIDER_API CALL
Abram Sanches), Internal Medicine  [address] [place] [address]  Phone: [phone]  Fax: [phone]

## 2019-01-28 NOTE — ED ADULT NURSE NOTE - NSIMPLEMENTINTERV_GEN_ALL_ED
Implemented All Fall with Harm Risk Interventions:  [place] to call system. Call bell, personal items and telephone within reach. Instruct patient to call for assistance. Room bathroom lighting operational. Non-slip footwear when patient is off stretcher. Physically safe environment: no spills, clutter or unnecessary equipment. Stretcher in lowest position, wheels locked, appropriate side rails in place. Provide visual cue, wrist band, yellow gown, etc. Monitor gait and stability. Monitor for mental status changes and reorient to person, place, and time. Review medications for side effects contributing to fall risk. Reinforce activity limits and safety measures with patient and family. Provide visual clues: red socks.

## 2019-01-28 NOTE — ED PROVIDER NOTE - NSFOLLOWUPINSTRUCTIONS_ED_ALL_ED_FT
Follow up with your primary care doctor in 48 hours for re-evaluation and further treatment.    Fall Prevention in the Home    Falls can cause injuries and can affect people from all age groups. There are many simple things that you can do to make your home safe and to help prevent falls.    WHAT CAN I DO ON THE OUTSIDE OF MY HOME?  Regularly repair the edges of walkways and driveways and fix any cracks.  Remove high doorway thresholds.  Trim any shrubbery on the main path into your home.  Use bright outdoor lighting.  Clear walkways of debris and clutter, including tools and rocks.  Regularly check that handrails are securely fastened and in good repair. Both sides of any steps should have handrails.  Install guardrails along the edges of any raised decks or porches.  Have leaves, snow, and ice cleared regularly.  Use sand or salt on walkways during winter months.  In the garage, clean up any spills right away, including grease or oil spills.    WHAT CAN I DO IN THE BATHROOM?  Use night lights.  Install grab bars by the toilet and in the tub and shower. Do not use towel bars as grab bars.  Use non-skid mats or decals on the floor of the tub or shower.  If you need to sit down while you are in the shower, use a plastic, non-slip stool.  Keep the floor dry. Immediately clean up any water that spills on the floor.  Remove soap buildup in the tub or shower on a regular basis.  Attach bath mats securely with double-sided non-slip rug tape.  Remove throw rugs and other tripping hazards from the floor.    WHAT CAN I DO IN THE BEDROOM?  Use night lights.  Make sure that a bedside light is easy to reach.  Do not use oversized bedding that drapes onto the floor.  Have a firm chair that has side arms to use for getting dressed.  Remove throw rugs and other tripping hazards from the floor.    WHAT CAN I DO IN THE KITCHEN?  Clean up any spills right away.  Avoid walking on wet floors.  Place frequently used items in easy-to-reach places.  If you need to reach for something above you, use a sturdy step stool that has a grab bar.  Keep electrical cables out of the way.  Do not use floor polish or wax that makes floors slippery. If you have to use wax, make sure that it is non-skid floor wax.  Remove throw rugs and other tripping hazards from the floor.    WHAT CAN I DO IN THE STAIRWAYS?  Do not leave any items on the stairs.  Make sure that there are handrails on both sides of the stairs. Fix handrails that are broken or loose. Make sure that handrails are as long as the stairways.  Check any carpeting to make sure that it is firmly attached to the stairs. Fix any carpet that is loose or worn.  Avoid having throw rugs at the top or bottom of stairways, or secure the rugs with carpet tape to prevent them from moving.  Make sure that you have a light switch at the top of the stairs and the bottom of the stairs. If you do not have them, have them installed.    WHAT ARE SOME OTHER FALL PREVENTION TIPS?  Wear closed-toe shoes that fit well and support your feet. Wear shoes that have rubber soles or low heels.  When you use a stepladder, make sure that it is completely opened and that the sides are firmly locked. Have someone hold the ladder while you are using it. Do not climb a closed stepladder.  Add color or contrast paint or tape to grab bars and handrails in your home. Place contrasting color strips on the first and last steps.  Use mobility aids as needed, such as canes, walkers, scooters, and crutches.  Turn on lights if it is dark. Replace any light bulbs that burn out.  Set up furniture so that there are clear paths. Keep the furniture in the same spot.  Fix any uneven floor surfaces.  Choose a carpet design that does not hide the edge of steps of a stairway.  Be aware of any and all pets.  Review your medicines with your healthcare provider. Some medicines can cause dizziness or changes in blood pressure, which increase your risk of falling.     Talk with your health care provider about other ways that you can decrease your risk of falls. This may include working with a physical therapist or  to improve your strength, balance, and endurance.    ADDITIONAL NOTES AND INSTRUCTIONS    Please follow up with your Primary MD in 24-48 hr.  Seek immediate medical care for any new/worsening signs or symptoms.

## 2019-01-28 NOTE — ED PROVIDER NOTE - ATTENDING CONTRIBUTION TO CARE
89yr old  female on asa here for eval of mechanical fall from standing today. 89yr old  female on asa here for eval of mechanical fall from standing today. pt reportedly confused in way from ems, but here alert. well appearing, nontoxic, awake alert, ncat perrl eomi, no midline spinal ttp, no pain with compression of ribs, pelvis stable, no bony ext ttp, full rom X 4 s1s2 ctab soft nt/nd, perrl eomi, gcs 15, motor and sensation grossly intact, small blood filled blister right lower shin  impression fall, trauma alert from triage. pan scan, and re-eval

## 2019-01-28 NOTE — CONSULT NOTE ADULT - ATTENDING COMMENTS
89F with PMH significant for urinary incontinence, PUD, CKD, thoracic aortic aneurysm repaired in 2008, HLD, HTN, and OA who presented to the ED s/p fall from standing.  Patient states she does not remember the fall or the mechanism of the fall and is unsure if she lost consciousness.  EMS reported that she was confused and was found to have an ascending aortic aneurysm on CT scan measuring 7.4x7cm.  Pt resides in nursing home and is poor historian due to dementia.  Patient with multiple comorbidities, advanced age (>89 years of age) and demented.  Patient clearly meets criteria for surgery, at the same time she is not a surgical candidate.  Would proceed with medical therapy (BP/HR control)  case d/w ED dept staff  no follow up is needed     PAST MEDICAL & SURGICAL HISTORY:  Pacemaker  Urinary incontinence  PUD (peptic ulcer disease)  Thoracic aortic aneurysm without rupture  CKD (chronic kidney disease)  HLD (hyperlipidemia)  Afib  Osteoarthritis  Hypothyroid  HTN (hypertension)  H/O exploratory laparotomy: 1960s  History of thoracic aortic aneurysm repair: 2007 at [facility] by Barry Anderson and [place]  dementia
s/p fall   negative trauma w/u   plan as per ED

## 2019-01-28 NOTE — ED ADULT NURSE NOTE - PMH
Afib    CKD (chronic kidney disease)    HLD (hyperlipidemia)    HTN (hypertension)    Hypothyroid    Osteoarthritis    PUD (peptic ulcer disease)    Thoracic aortic aneurysm without rupture    Urinary incontinence Afib    CKD (chronic kidney disease)    HLD (hyperlipidemia)    HTN (hypertension)    Hypothyroid    Osteoarthritis    Pacemaker    PUD (peptic ulcer disease)    Thoracic aortic aneurysm without rupture    Urinary incontinence

## 2019-01-28 NOTE — ED PROVIDER NOTE - PMH
Afib    CKD (chronic kidney disease)    HLD (hyperlipidemia)    HTN (hypertension)    Hypothyroid    Osteoarthritis    Pacemaker    PUD (peptic ulcer disease)    Thoracic aortic aneurysm without rupture    Urinary incontinence

## 2019-03-19 ENCOUNTER — EMERGENCY (EMERGENCY)
Facility: HOSPITAL | Age: 84
LOS: 0 days | Discharge: HOME | End: 2019-03-19
Attending: EMERGENCY MEDICINE | Admitting: EMERGENCY MEDICINE

## 2019-03-19 VITALS
TEMPERATURE: 97 F | RESPIRATION RATE: 20 BRPM | HEART RATE: 65 BPM | OXYGEN SATURATION: 95 % | SYSTOLIC BLOOD PRESSURE: 134 MMHG | DIASTOLIC BLOOD PRESSURE: 63 MMHG

## 2019-03-19 VITALS — TEMPERATURE: 97 F | OXYGEN SATURATION: 98 %

## 2019-03-19 DIAGNOSIS — I13.0 HYPERTENSIVE HEART AND CHRONIC KIDNEY DISEASE WITH HEART FAILURE AND STAGE 1 THROUGH STAGE 4 CHRONIC KIDNEY DISEASE, OR UNSPECIFIED CHRONIC KIDNEY DISEASE: ICD-10-CM

## 2019-03-19 DIAGNOSIS — Z88.5 ALLERGY STATUS TO NARCOTIC AGENT: ICD-10-CM

## 2019-03-19 DIAGNOSIS — R07.89 OTHER CHEST PAIN: ICD-10-CM

## 2019-03-19 DIAGNOSIS — Z79.899 OTHER LONG TERM (CURRENT) DRUG THERAPY: ICD-10-CM

## 2019-03-19 DIAGNOSIS — Z98.890 OTHER SPECIFIED POSTPROCEDURAL STATES: Chronic | ICD-10-CM

## 2019-03-19 DIAGNOSIS — I50.9 HEART FAILURE, UNSPECIFIED: ICD-10-CM

## 2019-03-19 DIAGNOSIS — F03.90 UNSPECIFIED DEMENTIA WITHOUT BEHAVIORAL DISTURBANCE: ICD-10-CM

## 2019-03-19 DIAGNOSIS — E03.9 HYPOTHYROIDISM, UNSPECIFIED: ICD-10-CM

## 2019-03-19 DIAGNOSIS — N18.9 CHRONIC KIDNEY DISEASE, UNSPECIFIED: ICD-10-CM

## 2019-03-19 DIAGNOSIS — Z79.82 LONG TERM (CURRENT) USE OF ASPIRIN: ICD-10-CM

## 2019-03-19 DIAGNOSIS — E78.5 HYPERLIPIDEMIA, UNSPECIFIED: ICD-10-CM

## 2019-03-19 LAB
ALBUMIN SERPL ELPH-MCNC: 4 G/DL — SIGNIFICANT CHANGE UP (ref 3.5–5.2)
ALP SERPL-CCNC: 54 U/L — SIGNIFICANT CHANGE UP (ref 30–115)
ALT FLD-CCNC: 9 U/L — SIGNIFICANT CHANGE UP (ref 0–41)
ANION GAP SERPL CALC-SCNC: 11 MMOL/L — SIGNIFICANT CHANGE UP (ref 7–14)
APPEARANCE UR: CLEAR — SIGNIFICANT CHANGE UP
APTT BLD: 31.9 SEC — SIGNIFICANT CHANGE UP (ref 27–39.2)
AST SERPL-CCNC: 18 U/L — SIGNIFICANT CHANGE UP (ref 0–41)
BACTERIA # UR AUTO: ABNORMAL /HPF
BASE EXCESS BLDV CALC-SCNC: 3.6 MMOL/L — HIGH (ref -2–2)
BASOPHILS # BLD AUTO: 0.01 K/UL — SIGNIFICANT CHANGE UP (ref 0–0.2)
BASOPHILS # BLD AUTO: 0.02 K/UL — SIGNIFICANT CHANGE UP (ref 0–0.2)
BASOPHILS NFR BLD AUTO: 0.3 % — SIGNIFICANT CHANGE UP (ref 0–1)
BASOPHILS NFR BLD AUTO: 0.6 % — SIGNIFICANT CHANGE UP (ref 0–1)
BILIRUB SERPL-MCNC: 0.3 MG/DL — SIGNIFICANT CHANGE UP (ref 0.2–1.2)
BILIRUB UR-MCNC: NEGATIVE — SIGNIFICANT CHANGE UP
BUN SERPL-MCNC: 30 MG/DL — HIGH (ref 10–20)
CA-I SERPL-SCNC: 1.26 MMOL/L — SIGNIFICANT CHANGE UP (ref 1.12–1.3)
CALCIUM SERPL-MCNC: 9.5 MG/DL — SIGNIFICANT CHANGE UP (ref 8.5–10.1)
CHLORIDE SERPL-SCNC: 105 MMOL/L — SIGNIFICANT CHANGE UP (ref 98–110)
CO2 SERPL-SCNC: 26 MMOL/L — SIGNIFICANT CHANGE UP (ref 17–32)
COLOR SPEC: YELLOW — SIGNIFICANT CHANGE UP
CREAT SERPL-MCNC: 1.4 MG/DL — SIGNIFICANT CHANGE UP (ref 0.7–1.5)
DIFF PNL FLD: NEGATIVE — SIGNIFICANT CHANGE UP
EOSINOPHIL # BLD AUTO: 0.08 K/UL — SIGNIFICANT CHANGE UP (ref 0–0.7)
EOSINOPHIL # BLD AUTO: 0.08 K/UL — SIGNIFICANT CHANGE UP (ref 0–0.7)
EOSINOPHIL NFR BLD AUTO: 2.4 % — SIGNIFICANT CHANGE UP (ref 0–8)
EOSINOPHIL NFR BLD AUTO: 2.6 % — SIGNIFICANT CHANGE UP (ref 0–8)
EPI CELLS # UR: ABNORMAL /HPF
GAS PNL BLDV: 141 MMOL/L — SIGNIFICANT CHANGE UP (ref 136–145)
GAS PNL BLDV: SIGNIFICANT CHANGE UP
GLUCOSE SERPL-MCNC: 87 MG/DL — SIGNIFICANT CHANGE UP (ref 70–99)
GLUCOSE UR QL: NEGATIVE MG/DL — SIGNIFICANT CHANGE UP
HCO3 BLDV-SCNC: 30 MMOL/L — HIGH (ref 22–29)
HCT VFR BLD CALC: 35.9 % — LOW (ref 37–47)
HCT VFR BLD CALC: 36.2 % — LOW (ref 37–47)
HCT VFR BLDA CALC: 38.1 % — SIGNIFICANT CHANGE UP (ref 34–44)
HGB BLD CALC-MCNC: 12.4 G/DL — LOW (ref 14–18)
HGB BLD-MCNC: 11.7 G/DL — LOW (ref 12–16)
HGB BLD-MCNC: 12.1 G/DL — SIGNIFICANT CHANGE UP (ref 12–16)
IMM GRANULOCYTES NFR BLD AUTO: 0.3 % — SIGNIFICANT CHANGE UP (ref 0.1–0.3)
IMM GRANULOCYTES NFR BLD AUTO: 0.3 % — SIGNIFICANT CHANGE UP (ref 0.1–0.3)
INR BLD: 1.07 RATIO — SIGNIFICANT CHANGE UP (ref 0.65–1.3)
KETONES UR-MCNC: NEGATIVE — SIGNIFICANT CHANGE UP
LACTATE BLDV-MCNC: 0.8 MMOL/L — SIGNIFICANT CHANGE UP (ref 0.5–1.6)
LEUKOCYTE ESTERASE UR-ACNC: ABNORMAL
LIDOCAIN IGE QN: 40 U/L — SIGNIFICANT CHANGE UP (ref 7–60)
LYMPHOCYTES # BLD AUTO: 1.06 K/UL — LOW (ref 1.2–3.4)
LYMPHOCYTES # BLD AUTO: 1.27 K/UL — SIGNIFICANT CHANGE UP (ref 1.2–3.4)
LYMPHOCYTES # BLD AUTO: 34.2 % — SIGNIFICANT CHANGE UP (ref 20.5–51.1)
LYMPHOCYTES # BLD AUTO: 38.6 % — SIGNIFICANT CHANGE UP (ref 20.5–51.1)
MAGNESIUM SERPL-MCNC: 2.2 MG/DL — SIGNIFICANT CHANGE UP (ref 1.8–2.4)
MCHC RBC-ENTMCNC: 32.6 G/DL — SIGNIFICANT CHANGE UP (ref 32–37)
MCHC RBC-ENTMCNC: 33.4 G/DL — SIGNIFICANT CHANGE UP (ref 32–37)
MCHC RBC-ENTMCNC: 34.7 PG — HIGH (ref 27–31)
MCHC RBC-ENTMCNC: 34.7 PG — HIGH (ref 27–31)
MCV RBC AUTO: 103.7 FL — HIGH (ref 81–99)
MCV RBC AUTO: 106.5 FL — HIGH (ref 81–99)
MONOCYTES # BLD AUTO: 0.45 K/UL — SIGNIFICANT CHANGE UP (ref 0.1–0.6)
MONOCYTES # BLD AUTO: 0.5 K/UL — SIGNIFICANT CHANGE UP (ref 0.1–0.6)
MONOCYTES NFR BLD AUTO: 14.5 % — HIGH (ref 1.7–9.3)
MONOCYTES NFR BLD AUTO: 15.2 % — HIGH (ref 1.7–9.3)
NEUTROPHILS # BLD AUTO: 1.42 K/UL — SIGNIFICANT CHANGE UP (ref 1.4–6.5)
NEUTROPHILS # BLD AUTO: 1.48 K/UL — SIGNIFICANT CHANGE UP (ref 1.4–6.5)
NEUTROPHILS NFR BLD AUTO: 43.2 % — SIGNIFICANT CHANGE UP (ref 42.2–75.2)
NEUTROPHILS NFR BLD AUTO: 47.8 % — SIGNIFICANT CHANGE UP (ref 42.2–75.2)
NITRITE UR-MCNC: NEGATIVE — SIGNIFICANT CHANGE UP
NRBC # BLD: 0 /100 WBCS — SIGNIFICANT CHANGE UP (ref 0–0)
NRBC # BLD: 0 /100 WBCS — SIGNIFICANT CHANGE UP (ref 0–0)
NT-PROBNP SERPL-SCNC: 5394 PG/ML — HIGH (ref 0–300)
PCO2 BLDV: 51 MMHG — SIGNIFICANT CHANGE UP (ref 41–51)
PH BLDV: 7.38 — SIGNIFICANT CHANGE UP (ref 7.26–7.43)
PH UR: 6.5 — SIGNIFICANT CHANGE UP (ref 5–8)
PLATELET # BLD AUTO: 86 K/UL — LOW (ref 130–400)
PLATELET # BLD AUTO: 95 K/UL — LOW (ref 130–400)
PO2 BLDV: 29 MMHG — SIGNIFICANT CHANGE UP (ref 20–40)
POTASSIUM BLDV-SCNC: 4.8 MMOL/L — SIGNIFICANT CHANGE UP (ref 3.3–5.6)
POTASSIUM SERPL-MCNC: 5.2 MMOL/L — HIGH (ref 3.5–5)
POTASSIUM SERPL-SCNC: 5.2 MMOL/L — HIGH (ref 3.5–5)
PROT SERPL-MCNC: 6.3 G/DL — SIGNIFICANT CHANGE UP (ref 6–8)
PROT UR-MCNC: NEGATIVE MG/DL — SIGNIFICANT CHANGE UP
PROTHROM AB SERPL-ACNC: 12.3 SEC — SIGNIFICANT CHANGE UP (ref 9.95–12.87)
RBC # BLD: 3.37 M/UL — LOW (ref 4.2–5.4)
RBC # BLD: 3.49 M/UL — LOW (ref 4.2–5.4)
RBC # FLD: 12.5 % — SIGNIFICANT CHANGE UP (ref 11.5–14.5)
RBC # FLD: 12.6 % — SIGNIFICANT CHANGE UP (ref 11.5–14.5)
SAO2 % BLDV: 53 % — SIGNIFICANT CHANGE UP
SODIUM SERPL-SCNC: 142 MMOL/L — SIGNIFICANT CHANGE UP (ref 135–146)
SP GR SPEC: 1.01 — SIGNIFICANT CHANGE UP (ref 1.01–1.03)
TROPONIN T SERPL-MCNC: 0.02 NG/ML — HIGH
TROPONIN T SERPL-MCNC: 0.02 NG/ML — HIGH
UROBILINOGEN FLD QL: 0.2 MG/DL — SIGNIFICANT CHANGE UP (ref 0.2–0.2)
WBC # BLD: 3.1 K/UL — LOW (ref 4.8–10.8)
WBC # BLD: 3.29 K/UL — LOW (ref 4.8–10.8)
WBC # FLD AUTO: 3.1 K/UL — LOW (ref 4.8–10.8)
WBC # FLD AUTO: 3.29 K/UL — LOW (ref 4.8–10.8)
WBC UR QL: SIGNIFICANT CHANGE UP /HPF

## 2019-03-19 NOTE — ED PROVIDER NOTE - NS ED ROS FT
Review of Systems:  CONSTITUTIONAL: no fever  EYES: no photophobia, no blurred vision  ENT: no ear pain, no nasal discharge  RESPIRATORY: no shortness of breath, no cough  CARDIAC: + chest pain, no palpitations  GI: no abd pain, no nausea, no vomiting, no diarrhea, no constipation,   : no dysuria; no hematuria,   MUSCULOSKELETAL: no joint paint  NEUROLOGIC: no headache,   PSYCH: +dementia

## 2019-03-19 NOTE — ED PROVIDER NOTE - OBJECTIVE STATEMENT
89 y/o F, h/o dementia, anxiety, CAD, pacemaker, hypothyroid, thoracic aneurysm s/p repair (2008), CHF (EF ~35%), afib (no AC), CKD, from [facility] c/o chest pain/epigastric pain onset today. pt is a poor historian due to dementia and currently states "I feel relaxed here." Pt was recently diagnosed with a 7.4x7 AAA that is currently being medically managed. 89 y/o F, DNR/DNI, h/o dementia, anxiety, CAD, pacemaker, hypothyroid, thoracic aneurysm s/p repair (2008), CHF (EF ~35%), afib (no AC), CKD, from [facility] c/o chest pain/epigastric pain onset today. pt is a poor historian due to dementia and currently states "I feel relaxed here." Pt was recently diagnosed with a 7.4x7 AAA that is currently being medically managed. 89 y/o F, DNR/DNI, h/o dementia, anxiety, CAD, pacemaker, hypothyroid, thoracic aneurysm s/p repair (2008), CHF (EF ~35%), afib (no AC), CKD, from [facility] c/o chest pain/epigastric pain onset today. pt is a poor historian due to dementia and currently states "I feel relaxed here." Pt was recently diagnosed with a 7.4x7 AAA that is currently being medically managed per CT surgery recommendations.

## 2019-03-19 NOTE — ED ADULT NURSE NOTE - NSIMPLEMENTINTERV_GEN_ALL_ED
Implemented All Fall with Harm Risk Interventions:  [place] to call system. Call bell, personal items and telephone within reach. Instruct patient to call for assistance. Room bathroom lighting operational. Non-slip footwear when patient is off stretcher. Physically safe environment: no spills, clutter or unnecessary equipment. Stretcher in lowest position, wheels locked, appropriate side rails in place. Provide visual cue, wrist band, yellow gown, etc. Monitor gait and stability. Monitor for mental status changes and reorient to person, place, and time. Review medications for side effects contributing to fall risk. Reinforce activity limits and safety measures with patient and family. Provide visual clues: red socks.

## 2019-03-19 NOTE — ED PROVIDER NOTE - PROGRESS NOTE DETAILS
spoke with JOSE ALBERTO Bowman from from [facility]. states pt has been complaining of CP since this morning. states pt was holding her chest and said "it feels like my chest is coming out" pt had no other complaints and is at baseline. pt comfortable, NAD. currently not complaining of any pain. troponin and hgb stable. will d/c back to facility

## 2019-03-19 NOTE — ED PROVIDER NOTE - PHYSICAL EXAMINATION
VITAL SIGNS: I have reviewed nursing notes and confirm.  CONSTITUTIONAL: Well-developed; well-nourished; in no acute distress.   SKIN: Skin exam is warm and dry, <2 sec cap refill  HEAD: Normocephalic; atraumatic.  EYES: PERRL, EOM intact; normal conjunctiva.  ENT: MMM; airway clear.   NECK: Supple; non tender.  CARD: regular rate, irregular rhythm, 2+ dp pulses  RESP: No wheezes, rales or rhonchi, speaking in full sentences  ABD: soft non tender.   EXT: Normal ROM. No edema.  NEURO: Alert, oriented. Grossly unremarkable. No focal deficits.  PSYCH: Cooperative, appropriate.

## 2019-03-19 NOTE — ED PROVIDER NOTE - ATTENDING CONTRIBUTION TO CARE
89yo woman h/o dementia, CAD, ppm, afib not on anticoagulation, PPM, CHF (EF 35%), thoracic aneurysm repair in 2008 was sent from assisted living due to a complaint of chest pain. Pt now feels comfortable and is unable to give further history due to dementia. She denies current complaints, VS, exam as noted, pt nontoxic appearing. Lungs CTA, CVs1S2 irregular, abd soft, NT. Review of chart reveals consults from CT surgery during admission in January recommending against aneurysm repair due to advanced dementia and poor functional status. Will observe, check labs with cardiac enzymes, and likely d/c.

## 2019-03-19 NOTE — ED ADULT TRIAGE NOTE - CHIEF COMPLAINT QUOTE
BIBA for chest pain has history of dementia, pt was given 81 mg of Aspirin by nursing staff at the assisted living facility

## 2019-03-19 NOTE — ED PROVIDER NOTE - CLINICAL SUMMARY MEDICAL DECISION MAKING FREE TEXT BOX
Pt remained asymptomatic and comfortable throughout ED stay. Repeat EKG and enzymes unchanged. Given pt's advanced disease, low utitility in admission for workup. Will d/c.

## 2019-04-08 PROBLEM — Z00.00 ENCOUNTER FOR PREVENTIVE HEALTH EXAMINATION: Status: ACTIVE | Noted: 2019-04-08

## 2019-05-24 ENCOUNTER — INPATIENT (INPATIENT)
Facility: HOSPITAL | Age: 84
LOS: 5 days | Discharge: HOME | End: 2019-05-30
Attending: INTERNAL MEDICINE | Admitting: INTERNAL MEDICINE
Payer: MEDICARE

## 2019-05-24 VITALS
DIASTOLIC BLOOD PRESSURE: 65 MMHG | TEMPERATURE: 98 F | HEART RATE: 71 BPM | SYSTOLIC BLOOD PRESSURE: 136 MMHG | OXYGEN SATURATION: 95 % | RESPIRATION RATE: 20 BRPM

## 2019-05-24 DIAGNOSIS — Z98.890 OTHER SPECIFIED POSTPROCEDURAL STATES: Chronic | ICD-10-CM

## 2019-05-24 LAB
ALBUMIN SERPL ELPH-MCNC: 3.7 G/DL — SIGNIFICANT CHANGE UP (ref 3.5–5.2)
ALP SERPL-CCNC: 71 U/L — SIGNIFICANT CHANGE UP (ref 30–115)
ALT FLD-CCNC: 7 U/L — SIGNIFICANT CHANGE UP (ref 0–41)
ANION GAP SERPL CALC-SCNC: 11 MMOL/L — SIGNIFICANT CHANGE UP (ref 7–14)
APTT BLD: 32.2 SEC — SIGNIFICANT CHANGE UP (ref 27–39.2)
AST SERPL-CCNC: 14 U/L — SIGNIFICANT CHANGE UP (ref 0–41)
BASOPHILS # BLD AUTO: 0.01 K/UL — SIGNIFICANT CHANGE UP (ref 0–0.2)
BASOPHILS NFR BLD AUTO: 0.2 % — SIGNIFICANT CHANGE UP (ref 0–1)
BILIRUB SERPL-MCNC: 0.4 MG/DL — SIGNIFICANT CHANGE UP (ref 0.2–1.2)
BLD GP AB SCN SERPL QL: SIGNIFICANT CHANGE UP
BUN SERPL-MCNC: 19 MG/DL — SIGNIFICANT CHANGE UP (ref 10–20)
CALCIUM SERPL-MCNC: 9.4 MG/DL — SIGNIFICANT CHANGE UP (ref 8.5–10.1)
CHLORIDE SERPL-SCNC: 106 MMOL/L — SIGNIFICANT CHANGE UP (ref 98–110)
CO2 SERPL-SCNC: 27 MMOL/L — SIGNIFICANT CHANGE UP (ref 17–32)
CREAT SERPL-MCNC: 1.2 MG/DL — SIGNIFICANT CHANGE UP (ref 0.7–1.5)
EOSINOPHIL # BLD AUTO: 0.06 K/UL — SIGNIFICANT CHANGE UP (ref 0–0.7)
EOSINOPHIL NFR BLD AUTO: 1.5 % — SIGNIFICANT CHANGE UP (ref 0–8)
GLUCOSE SERPL-MCNC: 149 MG/DL — HIGH (ref 70–99)
HCT VFR BLD CALC: 31.8 % — LOW (ref 37–47)
HGB BLD-MCNC: 10.2 G/DL — LOW (ref 12–16)
IMM GRANULOCYTES NFR BLD AUTO: 0.2 % — SIGNIFICANT CHANGE UP (ref 0.1–0.3)
INR BLD: 1.11 RATIO — SIGNIFICANT CHANGE UP (ref 0.65–1.3)
LACTATE SERPL-SCNC: 1.4 MMOL/L — SIGNIFICANT CHANGE UP (ref 0.5–2.2)
LYMPHOCYTES # BLD AUTO: 0.81 K/UL — LOW (ref 1.2–3.4)
LYMPHOCYTES # BLD AUTO: 20 % — LOW (ref 20.5–51.1)
MAGNESIUM SERPL-MCNC: 2.4 MG/DL — SIGNIFICANT CHANGE UP (ref 1.8–2.4)
MCHC RBC-ENTMCNC: 32.1 G/DL — SIGNIFICANT CHANGE UP (ref 32–37)
MCHC RBC-ENTMCNC: 34.1 PG — HIGH (ref 27–31)
MCV RBC AUTO: 106.4 FL — HIGH (ref 81–99)
MONOCYTES # BLD AUTO: 0.58 K/UL — SIGNIFICANT CHANGE UP (ref 0.1–0.6)
MONOCYTES NFR BLD AUTO: 14.3 % — HIGH (ref 1.7–9.3)
NEUTROPHILS # BLD AUTO: 2.58 K/UL — SIGNIFICANT CHANGE UP (ref 1.4–6.5)
NEUTROPHILS NFR BLD AUTO: 63.8 % — SIGNIFICANT CHANGE UP (ref 42.2–75.2)
NRBC # BLD: 0 /100 WBCS — SIGNIFICANT CHANGE UP (ref 0–0)
PLATELET # BLD AUTO: 203 K/UL — SIGNIFICANT CHANGE UP (ref 130–400)
POTASSIUM SERPL-MCNC: 5.1 MMOL/L — HIGH (ref 3.5–5)
POTASSIUM SERPL-SCNC: 5.1 MMOL/L — HIGH (ref 3.5–5)
PROT SERPL-MCNC: 6.8 G/DL — SIGNIFICANT CHANGE UP (ref 6–8)
PROTHROM AB SERPL-ACNC: 12.7 SEC — SIGNIFICANT CHANGE UP (ref 9.95–12.87)
RBC # BLD: 2.99 M/UL — LOW (ref 4.2–5.4)
RBC # FLD: 13.2 % — SIGNIFICANT CHANGE UP (ref 11.5–14.5)
SODIUM SERPL-SCNC: 144 MMOL/L — SIGNIFICANT CHANGE UP (ref 135–146)
TYPE + AB SCN PNL BLD: SIGNIFICANT CHANGE UP
WBC # BLD: 4.05 K/UL — LOW (ref 4.8–10.8)
WBC # FLD AUTO: 4.05 K/UL — LOW (ref 4.8–10.8)

## 2019-05-24 PROCEDURE — 71045 X-RAY EXAM CHEST 1 VIEW: CPT | Mod: 26

## 2019-05-24 PROCEDURE — 93010 ELECTROCARDIOGRAM REPORT: CPT

## 2019-05-24 PROCEDURE — 99285 EMERGENCY DEPT VISIT HI MDM: CPT

## 2019-05-24 PROCEDURE — 73590 X-RAY EXAM OF LOWER LEG: CPT | Mod: 26,RT

## 2019-05-24 PROCEDURE — 71046 X-RAY EXAM CHEST 2 VIEWS: CPT | Mod: 26

## 2019-05-24 RX ORDER — LEVOTHYROXINE SODIUM 125 MCG
1 TABLET ORAL
Qty: 0 | Refills: 0 | DISCHARGE

## 2019-05-24 RX ORDER — ENOXAPARIN SODIUM 100 MG/ML
40 INJECTION SUBCUTANEOUS DAILY
Refills: 0 | Status: DISCONTINUED | OUTPATIENT
Start: 2019-05-24 | End: 2019-05-30

## 2019-05-24 RX ORDER — DOCUSATE SODIUM 100 MG
100 CAPSULE ORAL
Refills: 0 | Status: DISCONTINUED | OUTPATIENT
Start: 2019-05-24 | End: 2019-05-30

## 2019-05-24 RX ORDER — AMPICILLIN SODIUM AND SULBACTAM SODIUM 250; 125 MG/ML; MG/ML
INJECTION, POWDER, FOR SUSPENSION INTRAMUSCULAR; INTRAVENOUS
Refills: 0 | Status: DISCONTINUED | OUTPATIENT
Start: 2019-05-24 | End: 2019-05-25

## 2019-05-24 RX ORDER — AMIODARONE HYDROCHLORIDE 400 MG/1
1 TABLET ORAL
Qty: 0 | Refills: 0 | DISCHARGE

## 2019-05-24 RX ORDER — AMPICILLIN SODIUM AND SULBACTAM SODIUM 250; 125 MG/ML; MG/ML
3 INJECTION, POWDER, FOR SUSPENSION INTRAMUSCULAR; INTRAVENOUS EVERY 6 HOURS
Refills: 0 | Status: DISCONTINUED | OUTPATIENT
Start: 2019-05-25 | End: 2019-05-25

## 2019-05-24 RX ORDER — DOCUSATE SODIUM 100 MG
1 CAPSULE ORAL
Qty: 0 | Refills: 0 | DISCHARGE

## 2019-05-24 RX ORDER — LOSARTAN POTASSIUM 100 MG/1
100 TABLET, FILM COATED ORAL DAILY
Refills: 0 | Status: DISCONTINUED | OUTPATIENT
Start: 2019-05-24 | End: 2019-05-30

## 2019-05-24 RX ORDER — CLONAZEPAM 1 MG
0 TABLET ORAL
Qty: 0 | Refills: 0 | DISCHARGE

## 2019-05-24 RX ORDER — ATORVASTATIN CALCIUM 80 MG/1
10 TABLET, FILM COATED ORAL AT BEDTIME
Refills: 0 | Status: DISCONTINUED | OUTPATIENT
Start: 2019-05-24 | End: 2019-05-30

## 2019-05-24 RX ORDER — ASCORBIC ACID 60 MG
1 TABLET,CHEWABLE ORAL
Qty: 0 | Refills: 0 | DISCHARGE

## 2019-05-24 RX ORDER — FUROSEMIDE 40 MG
20 TABLET ORAL DAILY
Refills: 0 | Status: DISCONTINUED | OUTPATIENT
Start: 2019-05-24 | End: 2019-05-30

## 2019-05-24 RX ORDER — VANCOMYCIN HCL 1 G
1000 VIAL (EA) INTRAVENOUS ONCE
Refills: 0 | Status: COMPLETED | OUTPATIENT
Start: 2019-05-24 | End: 2019-05-24

## 2019-05-24 RX ORDER — AMIODARONE HYDROCHLORIDE 400 MG/1
200 TABLET ORAL DAILY
Refills: 0 | Status: DISCONTINUED | OUTPATIENT
Start: 2019-05-24 | End: 2019-05-30

## 2019-05-24 RX ORDER — LABETALOL HCL 100 MG
1 TABLET ORAL
Qty: 0 | Refills: 0 | DISCHARGE

## 2019-05-24 RX ORDER — AMPICILLIN SODIUM AND SULBACTAM SODIUM 250; 125 MG/ML; MG/ML
3 INJECTION, POWDER, FOR SUSPENSION INTRAMUSCULAR; INTRAVENOUS ONCE
Refills: 0 | Status: COMPLETED | OUTPATIENT
Start: 2019-05-24 | End: 2019-05-24

## 2019-05-24 RX ORDER — CEFEPIME 1 G/1
1000 INJECTION, POWDER, FOR SOLUTION INTRAMUSCULAR; INTRAVENOUS ONCE
Refills: 0 | Status: COMPLETED | OUTPATIENT
Start: 2019-05-24 | End: 2019-05-24

## 2019-05-24 RX ORDER — FUROSEMIDE 40 MG
1 TABLET ORAL
Qty: 0 | Refills: 0 | DISCHARGE

## 2019-05-24 RX ORDER — CHLORHEXIDINE GLUCONATE 213 G/1000ML
1 SOLUTION TOPICAL
Refills: 0 | Status: DISCONTINUED | OUTPATIENT
Start: 2019-05-24 | End: 2019-05-30

## 2019-05-24 RX ORDER — ACETAMINOPHEN 500 MG
1 TABLET ORAL
Qty: 0 | Refills: 0 | DISCHARGE

## 2019-05-24 RX ORDER — ASPIRIN/CALCIUM CARB/MAGNESIUM 324 MG
1 TABLET ORAL
Qty: 0 | Refills: 0 | DISCHARGE

## 2019-05-24 RX ORDER — ASPIRIN/CALCIUM CARB/MAGNESIUM 324 MG
81 TABLET ORAL DAILY
Refills: 0 | Status: DISCONTINUED | OUTPATIENT
Start: 2019-05-24 | End: 2019-05-30

## 2019-05-24 RX ORDER — CLONAZEPAM 1 MG
0.5 TABLET ORAL DAILY
Refills: 0 | Status: DISCONTINUED | OUTPATIENT
Start: 2019-05-24 | End: 2019-05-30

## 2019-05-24 RX ORDER — LABETALOL HCL 100 MG
100 TABLET ORAL
Refills: 0 | Status: DISCONTINUED | OUTPATIENT
Start: 2019-05-24 | End: 2019-05-30

## 2019-05-24 RX ORDER — OXYBUTYNIN CHLORIDE 5 MG
1 TABLET ORAL
Qty: 0 | Refills: 0 | DISCHARGE

## 2019-05-24 RX ORDER — OXYBUTYNIN CHLORIDE 5 MG
5 TABLET ORAL DAILY
Refills: 0 | Status: DISCONTINUED | OUTPATIENT
Start: 2019-05-24 | End: 2019-05-30

## 2019-05-24 RX ORDER — LACTOBACILLUS ACIDOPHILUS 100MM CELL
1 CAPSULE ORAL
Qty: 0 | Refills: 0 | DISCHARGE

## 2019-05-24 RX ORDER — ACETAMINOPHEN 500 MG
650 TABLET ORAL EVERY 6 HOURS
Refills: 0 | Status: DISCONTINUED | OUTPATIENT
Start: 2019-05-24 | End: 2019-05-30

## 2019-05-24 RX ORDER — VALSARTAN 80 MG/1
1 TABLET ORAL
Qty: 0 | Refills: 0 | DISCHARGE

## 2019-05-24 RX ORDER — ACETAMINOPHEN 500 MG
0 TABLET ORAL
Qty: 0 | Refills: 0 | DISCHARGE

## 2019-05-24 RX ORDER — LEVOTHYROXINE SODIUM 125 MCG
50 TABLET ORAL DAILY
Refills: 0 | Status: DISCONTINUED | OUTPATIENT
Start: 2019-05-24 | End: 2019-05-30

## 2019-05-24 RX ADMIN — CEFEPIME 100 MILLIGRAM(S): 1 INJECTION, POWDER, FOR SOLUTION INTRAMUSCULAR; INTRAVENOUS at 16:37

## 2019-05-24 RX ADMIN — Medication 250 MILLIGRAM(S): at 17:11

## 2019-05-24 RX ADMIN — ATORVASTATIN CALCIUM 10 MILLIGRAM(S): 80 TABLET, FILM COATED ORAL at 22:42

## 2019-05-24 RX ADMIN — CEFEPIME 1000 MILLIGRAM(S): 1 INJECTION, POWDER, FOR SOLUTION INTRAMUSCULAR; INTRAVENOUS at 17:07

## 2019-05-24 RX ADMIN — AMPICILLIN SODIUM AND SULBACTAM SODIUM 200 GRAM(S): 250; 125 INJECTION, POWDER, FOR SUSPENSION INTRAMUSCULAR; INTRAVENOUS at 22:42

## 2019-05-24 RX ADMIN — Medication 100 MILLIGRAM(S): at 22:42

## 2019-05-24 NOTE — H&P ADULT - ASSESSMENT
# RLE venous stasis ulcer with surrounding skin showing signs of cellulitis  - admit to medicine  - start IV unasyn + IV clindamycin  - consult ID  - consult vascular surgery to evaluate for possible debridement--> lot of necrotic tissue present  - elevate RLE  - c/w asa 81  - tylenol prn for pain control    # h/o AFib--> not on AC though high CHADVASC- 5  --> patient has advanced dementia and must be fall risk so would keep off anticoagulation though functional status should be assessed--> d/w Dr Urban  --> c/w BB, amiodarone    # h/o HTN--> c/w ARB- as valsartan not available--> change to losartan  - c/w labetalol    # CKD3--> stable    # DLD--> c/w lipitor 10, though consider stopping baed on d/w attending as advanced age and dementia, risk>benefit    # HFrEF  - euvolemic on exam  - last Echo EF 35%  - c/w lasix, BB, BP meds    # h/o PUD--> c/w maalox prn for dyspepsia    # AAA-->  < from: CT Angio Abdomen and Pelvis w/ IV Cont (01.28.19 @ 13:48) >  1. Marked aneurysmal dilation of ascending thoracic aorta, 7.4 x 7 cm.   Aortic arch dilated up to 4.2 cm, descending thoracic aorta up to 3.5 cm.     < end of copied text >  - based on CT surgery eval on last admission--> c/w conservative management, not a surgical candidate    # DVT Px--> lovenox SQ  Diet--> DASH, mechanical soft  Activity--> OOB with assistance  Dispo--> from [facility], can go back there after medical condition better    # Code status- DNR- based on SNF papers, HCP paper also in chart

## 2019-05-24 NOTE — ED PROVIDER NOTE - OBJECTIVE STATEMENT
89 y/o female with pmhx of afib, CKD, DLD, HTN, hypothyroidism, OA, PUD, thoracic aortic aneurysm presents from Dr. Urban's office for evaluation of RLE wound. Patient is a poor historian, is unable to state why she is here. Per Dr. Urban, patient has had chronic venous stasis b/l LE, which progressed to cellulitis and ultimately ulcer formation. Denies fevers/chills.

## 2019-05-24 NOTE — H&P ADULT - NSHPPHYSICALEXAM_GEN_ALL_CORE
GENERAL: NAD, lying comfortably in bed  HEAD:  Atraumatic, Normocephalic  EYES:  conjunctiva and sclera clear  NECK: Supple, No JVD  CHEST/LUNG: Clear to auscultation bilaterally; No wheeze  HEART: Regular rate and rhythm; No murmurs, rubs, or gallops  ABDOMEN: Soft, Nontender, Nondistended; Bowel sounds present  EXTREMITIES:   b/l pulses unable to palpate  No c/c, RLE edema   RLE open ulcer on medial side of leg--> about 5*3 cm, necrotic tissue seen, surrounding skin erythematous, warm, tender, no active discharge  PSYCH: AAOx1(to self)  NEUROLOGY: grossly non-focal  SKIN: No rashes or lesions

## 2019-05-24 NOTE — CHART NOTE - NSCHARTNOTEFT_GEN_A_CORE
spoke with ED attending  earlier  pt sent from the [place] where I had seen pt earlier  Worsening wound and celullitis RLE, was on silvadene treatment there.  Place on IV Abx here, and will speak with ID Dr Amanda Ku to see pt later

## 2019-05-24 NOTE — ED ADULT TRIAGE NOTE - CHIEF COMPLAINT QUOTE
JENNIFER from the [facility], pt here for right leg ulcer and sent for admission for IV antibiotics as stated on paperwork with pt from dr. Lydia Farris

## 2019-05-24 NOTE — H&P ADULT - HISTORY OF PRESENT ILLNESS
89 yo F w/ multiple comorbidities including advanced dementia, sent in from [facility] by Dr Urban for IV abx treatment for infected RLE venous stasis ulcer. Patient unable to provide any history, mumbles irrelevant things when asked question. History obtained from chart and SNF paperwork. Duration of ulcer unknown. Patient denies any pain anywhere. In ED, VS stable. Patient got admitted under Dr Urban's service for further management.

## 2019-05-24 NOTE — ED ADULT NURSE NOTE - INTERVENTIONS DEFINITIONS
[place] to call system/Stretcher in lowest position, wheels locked, appropriate side rails in place/Provide visual cue, wrist band, yellow gown, etc./Monitor gait and stability/Instruct patient to call for assistance/Call bell, personal items and telephone within reach/Monitor for mental status changes and reorient to person, place, and time/Reinforce activity limits and safety measures with patient and family/Non-slip footwear when patient is off stretcher/Physically safe environment: no spills, clutter or unnecessary equipment/Review medications for side effects contributing to fall risk

## 2019-05-24 NOTE — H&P ADULT - NSHPLABSRESULTS_GEN_ALL_CORE
10.2   4.05  )-----------( 203      ( 24 May 2019 15:01 )             31.8       05-24    144  |  106  |  19  ----------------------------<  149<H>  5.1<H>   |  27  |  1.2    Ca    9.4      24 May 2019 15:01  Mg     2.4     05-24    TPro  6.8  /  Alb  3.7  /  TBili  0.4  /  DBili  x   /  AST  14  /  ALT  7   /  AlkPhos  71  05-24            PT/INR - ( 24 May 2019 15:20 )   PT: 12.70 sec;   INR: 1.11 ratio         PTT - ( 24 May 2019 15:20 )  PTT:32.2 sec    Lactate Trend  05-24 @ 15:20 Lactate:1.4

## 2019-05-24 NOTE — ED ADULT NURSE NOTE - CHIEF COMPLAINT QUOTE
JENNIFER from the [facility], pt here for right leg ulcer and sent for admission for IV antibiotics as stated on paperwork with pt from dr. Lydia Farris

## 2019-05-24 NOTE — ED PROVIDER NOTE - PHYSICAL EXAMINATION
CONSTITUTIONAL: Well-developed; well-nourished; in no acute distress.   SKIN: warm, dry  HEAD: Normocephalic; atraumatic.  EYES: no conj injection  ENT: No nasal discharge; airway clear.  NECK: Supple; non tender.  CARD: S1, S2 normal; no murmurs, gallops, or rubs. Regular rate and rhythm.   RESP: No wheezes, rales or rhonchi.  ABD: soft ntnd  EXT: +RLE ulcer noted with necrotic lesions throughout, no foul smell, no purulent drainage   PSYCH: Cooperative, appropriate.

## 2019-05-24 NOTE — H&P ADULT - NSICDXPASTMEDICALHX_GEN_ALL_CORE_FT
PAST MEDICAL HISTORY:  Afib     Chronic HFrEF (heart failure with reduced ejection fraction) EF 35%    CKD (chronic kidney disease)     Dementia     HLD (hyperlipidemia)     HTN (hypertension)     Hypothyroid     Osteoarthritis     PUD (peptic ulcer disease)     Thoracic aortic aneurysm without rupture     Urinary incontinence

## 2019-05-24 NOTE — ED PROVIDER NOTE - ATTENDING CONTRIBUTION TO CARE
89 yo f with pmh of Afib CKD HLD HTN Hypothyroid OA PPM PUD T.Aort Aneurysm sent by dr. Peraza for RLE venous stasis ulcer with cellulitis.  dr. peraza would like pt started on iv abx and admitted to his service.  unclear how long pt has had the ulcer.  no reported fever or chills.  no cp, no sob, no abd pain.  pt is poor historian.  exam: cachectic, ncat, perrl, eomi, mmm, irreg, ctab, abd soft, nt,nd, RLE medial large venous stasis ulcer with surrounding erythema, no active drainage imp: pt with RLE ulcer and cellulitis, no fever here, labs, abx, xrays, admission 89 yo f with pmh of Afib CKD HLD HTN Hypothyroid OA PPM PUD T.Aort Aneurysm sent by dr. Urban for RLE venous stasis ulcer with cellulitis.  dr. Urban would like pt started on iv abx and admitted to his service.  unclear how long pt has had the ulcer.  no reported fever or chills.  no cp, no sob, no abd pain.  pt is poor historian.  exam: cachectic, ncat, perrl, eomi, mmm, irreg, ctab, abd soft, nt,nd, RLE medial large venous stasis ulcer with surrounding erythema, no active drainage imp: pt with RLE ulcer and cellulitis, no fever here, labs, abx, xrays, admission

## 2019-05-24 NOTE — H&P ADULT - NSICDXPASTSURGICALHX_GEN_ALL_CORE_FT
PAST SURGICAL HISTORY:  H/O exploratory laparotomy 1960s    History of thoracic aortic aneurysm repair 2008

## 2019-05-25 LAB
ANION GAP SERPL CALC-SCNC: 11 MMOL/L — SIGNIFICANT CHANGE UP (ref 7–14)
BUN SERPL-MCNC: 14 MG/DL — SIGNIFICANT CHANGE UP (ref 10–20)
CALCIUM SERPL-MCNC: 8.9 MG/DL — SIGNIFICANT CHANGE UP (ref 8.5–10.1)
CHLORIDE SERPL-SCNC: 104 MMOL/L — SIGNIFICANT CHANGE UP (ref 98–110)
CO2 SERPL-SCNC: 28 MMOL/L — SIGNIFICANT CHANGE UP (ref 17–32)
CREAT SERPL-MCNC: 1 MG/DL — SIGNIFICANT CHANGE UP (ref 0.7–1.5)
GLUCOSE SERPL-MCNC: 92 MG/DL — SIGNIFICANT CHANGE UP (ref 70–99)
HCT VFR BLD CALC: 33.2 % — LOW (ref 37–47)
HGB BLD-MCNC: 10.6 G/DL — LOW (ref 12–16)
MCHC RBC-ENTMCNC: 31.9 G/DL — LOW (ref 32–37)
MCHC RBC-ENTMCNC: 33.5 PG — HIGH (ref 27–31)
MCV RBC AUTO: 105.1 FL — HIGH (ref 81–99)
NRBC # BLD: 0 /100 WBCS — SIGNIFICANT CHANGE UP (ref 0–0)
PLATELET # BLD AUTO: 193 K/UL — SIGNIFICANT CHANGE UP (ref 130–400)
POTASSIUM SERPL-MCNC: 4.3 MMOL/L — SIGNIFICANT CHANGE UP (ref 3.5–5)
POTASSIUM SERPL-SCNC: 4.3 MMOL/L — SIGNIFICANT CHANGE UP (ref 3.5–5)
RBC # BLD: 3.16 M/UL — LOW (ref 4.2–5.4)
RBC # FLD: 13.1 % — SIGNIFICANT CHANGE UP (ref 11.5–14.5)
SODIUM SERPL-SCNC: 143 MMOL/L — SIGNIFICANT CHANGE UP (ref 135–146)
WBC # BLD: 3.56 K/UL — LOW (ref 4.8–10.8)
WBC # FLD AUTO: 3.56 K/UL — LOW (ref 4.8–10.8)

## 2019-05-25 RX ORDER — CEFPODOXIME PROXETIL 100 MG
200 TABLET ORAL EVERY 12 HOURS
Refills: 0 | Status: DISCONTINUED | OUTPATIENT
Start: 2019-05-25 | End: 2019-05-30

## 2019-05-25 RX ADMIN — AMPICILLIN SODIUM AND SULBACTAM SODIUM 200 GRAM(S): 250; 125 INJECTION, POWDER, FOR SUSPENSION INTRAMUSCULAR; INTRAVENOUS at 05:15

## 2019-05-25 RX ADMIN — LOSARTAN POTASSIUM 100 MILLIGRAM(S): 100 TABLET, FILM COATED ORAL at 05:17

## 2019-05-25 RX ADMIN — ATORVASTATIN CALCIUM 10 MILLIGRAM(S): 80 TABLET, FILM COATED ORAL at 21:39

## 2019-05-25 RX ADMIN — Medication 50 MICROGRAM(S): at 05:17

## 2019-05-25 RX ADMIN — AMPICILLIN SODIUM AND SULBACTAM SODIUM 200 GRAM(S): 250; 125 INJECTION, POWDER, FOR SUSPENSION INTRAMUSCULAR; INTRAVENOUS at 17:22

## 2019-05-25 RX ADMIN — Medication 100 MILLIGRAM(S): at 21:37

## 2019-05-25 RX ADMIN — Medication 81 MILLIGRAM(S): at 17:46

## 2019-05-25 RX ADMIN — AMPICILLIN SODIUM AND SULBACTAM SODIUM 200 GRAM(S): 250; 125 INJECTION, POWDER, FOR SUSPENSION INTRAMUSCULAR; INTRAVENOUS at 17:23

## 2019-05-25 RX ADMIN — ENOXAPARIN SODIUM 40 MILLIGRAM(S): 100 INJECTION SUBCUTANEOUS at 17:22

## 2019-05-25 RX ADMIN — Medication 5 MILLIGRAM(S): at 17:23

## 2019-05-25 RX ADMIN — Medication 100 MILLIGRAM(S): at 05:17

## 2019-05-25 RX ADMIN — Medication 20 MILLIGRAM(S): at 05:17

## 2019-05-25 RX ADMIN — Medication 100 MILLIGRAM(S): at 17:23

## 2019-05-25 RX ADMIN — AMPICILLIN SODIUM AND SULBACTAM SODIUM 200 GRAM(S): 250; 125 INJECTION, POWDER, FOR SUSPENSION INTRAMUSCULAR; INTRAVENOUS at 00:58

## 2019-05-25 RX ADMIN — CHLORHEXIDINE GLUCONATE 1 APPLICATION(S): 213 SOLUTION TOPICAL at 05:17

## 2019-05-25 RX ADMIN — AMIODARONE HYDROCHLORIDE 200 MILLIGRAM(S): 400 TABLET ORAL at 05:17

## 2019-05-25 RX ADMIN — Medication 100 MILLIGRAM(S): at 05:15

## 2019-05-25 RX ADMIN — Medication 100 MILLIGRAM(S): at 17:21

## 2019-05-25 NOTE — CONSULT NOTE ADULT - ASSESSMENT
89F with chronic venous stasis ulcer on RLE     Plan:  No need for surgical debridement at this time   Should dress wound with santyl daily   F/u with Dr. Acevedo as an outpatient following discharge

## 2019-05-25 NOTE — PROGRESS NOTE ADULT - SUBJECTIVE AND OBJECTIVE BOX
Admit Note    Pt resident at [facility], has had venous stasis for few months  Recently had developed an ulcer / wound on RLE for which VNS services were in progress but were stopped a week ago  Had been on silvadene dressings , but area was getting worse and increased   surrounding redness and pain,  H/O PPM, HFrEF, Afib, advanced dementia, OA, h/o thoracic and AAA  Has had fall off and on, so deemed high risk for antocoagulation.  Admitted for IV Abx, aggressive wound care.      MEDICATIONS  (STANDING):  amiodarone    Tablet 200 milliGRAM(s) Oral daily  ampicillin/sulbactam  IVPB      ampicillin/sulbactam  IVPB 3 Gram(s) IV Intermittent every 6 hours  aspirin  chewable 81 milliGRAM(s) Oral daily  atorvastatin 10 milliGRAM(s) Oral at bedtime  chlorhexidine 4% Liquid 1 Application(s) Topical <User Schedule>  clindamycin IVPB      clindamycin IVPB 300 milliGRAM(s) IV Intermittent every 8 hours  docusate sodium 100 milliGRAM(s) Oral two times a day  enoxaparin Injectable 40 milliGRAM(s) SubCutaneous daily  furosemide    Tablet 20 milliGRAM(s) Oral daily  labetalol 100 milliGRAM(s) Oral two times a day  levothyroxine 50 MICROGram(s) Oral daily  losartan 100 milliGRAM(s) Oral daily  oxybutynin 5 milliGRAM(s) Oral daily    MEDICATIONS  (PRN):  acetaminophen   Tablet .. 650 milliGRAM(s) Oral every 6 hours PRN Mild Pain (1 - 3)  aluminum hydroxide/magnesium hydroxide/simethicone Suspension 30 milliLiter(s) Oral every 6 hours PRN Dyspepsia  clonazePAM  Tablet 0.5 milliGRAM(s) Oral daily PRN anxiety/agitation      Vital Signs Last 24 Hrs  T(C): 37.1 (25 May 2019 04:56), Max: 37.1 (24 May 2019 20:40)  T(F): 98.7 (25 May 2019 04:56), Max: 98.7 (24 May 2019 20:40)  HR: 69 (25 May 2019 06:27) (69 - 77)  BP: 110/54 (25 May 2019 06:27) (110/54 - 176/80)  BP(mean): --  RR: 18 (25 May 2019 04:56) (18 - 20)  SpO2: 96% (24 May 2019 15:23) (95% - 96%)    PHYSICAL EXAM:    Constitutional: NAD, well-groomed, well-developed  HEENT: PERRLA, EOMI, Normal Hearing, MMM  Neck: No LAD, No JVD  Back: Normal spine flexure, No CVA tenderness  Respiratory: CTAB/L  Cardiovascular: S1 and S2, RRR, no M/G/R  Gastrointestinal: BS+, soft, NT/ND  Extremities: RLE large oval shaped wound on the shin with surrounding redness, and tenderness    Neurological: , no focal deficits, demented    LABS:                        10.2   4.05  )-----------( 203      ( 24 May 2019 15:01 )             31.8     05-24    144  |  106  |  19  ----------------------------<  149<H>  5.1<H>   |  27  |  1.2    Ca    9.4      24 May 2019 15:01  Mg     2.4     05-24    TPro  6.8  /  Alb  3.7  /  TBili  0.4  /  DBili  x   /  AST  14  /  ALT  7   /  AlkPhos  71  05-24    PT/INR - ( 24 May 2019 15:20 )   PT: 12.70 sec;   INR: 1.11 ratio         PTT - ( 24 May 2019 15:20 )  PTT:32.2 sec    Drug Screen Urine:  Alcohol Levelcn/a        RADIOLOGY & ADDITIONAL STUDIES: reviewed in PACS

## 2019-05-25 NOTE — PROGRESS NOTE ADULT - SUBJECTIVE AND OBJECTIVE BOX
Patient is a 89y old  Female who was sent in by Dr Urban for IV abx for RLE venous stasis ulcer    Interval events: none    Today is hosp day 1  Patient is resting comfortably in bed           PAST MEDICAL & SURGICAL HISTORY:  Chronic HFrEF (heart failure with reduced ejection fraction): EF 35%  Dementia  Urinary incontinence  PUD (peptic ulcer disease)  Thoracic aortic aneurysm without rupture  CKD (chronic kidney disease)  HLD (hyperlipidemia)  Afib  Osteoarthritis  Hypothyroid  HTN (hypertension)  H/O exploratory laparotomy: 1960s  History of thoracic aortic aneurysm repair: 2008      MEDICATIONS  (STANDING):  amiodarone    Tablet 200 milliGRAM(s) Oral daily  ampicillin/sulbactam  IVPB      ampicillin/sulbactam  IVPB 3 Gram(s) IV Intermittent every 6 hours  aspirin  chewable 81 milliGRAM(s) Oral daily  atorvastatin 10 milliGRAM(s) Oral at bedtime  chlorhexidine 4% Liquid 1 Application(s) Topical <User Schedule>  clindamycin IVPB      clindamycin IVPB 300 milliGRAM(s) IV Intermittent every 8 hours  docusate sodium 100 milliGRAM(s) Oral two times a day  enoxaparin Injectable 40 milliGRAM(s) SubCutaneous daily  furosemide    Tablet 20 milliGRAM(s) Oral daily  labetalol 100 milliGRAM(s) Oral two times a day  levothyroxine 50 MICROGram(s) Oral daily  losartan 100 milliGRAM(s) Oral daily  oxybutynin 5 milliGRAM(s) Oral daily    MEDICATIONS  (PRN):  acetaminophen   Tablet .. 650 milliGRAM(s) Oral every 6 hours PRN Mild Pain (1 - 3)  aluminum hydroxide/magnesium hydroxide/simethicone Suspension 30 milliLiter(s) Oral every 6 hours PRN Dyspepsia  clonazePAM  Tablet 0.5 milliGRAM(s) Oral daily PRN anxiety/agitation          Vital Signs Last 24 Hrs  T(C): 37.1 (25 May 2019 04:56), Max: 37.1 (24 May 2019 20:40)  T(F): 98.7 (25 May 2019 04:56), Max: 98.7 (24 May 2019 20:40)  HR: 69 (25 May 2019 06:27) (69 - 77)  BP: 110/54 (25 May 2019 06:27) (110/54 - 176/80)  BP(mean): --  RR: 18 (25 May 2019 04:56) (18 - 20)  SpO2: 96% (24 May 2019 15:23) (95% - 96%)  CAPILLARY BLOOD GLUCOSE        I&O's Summary      Physical Exam:    -     General : NAD, resting comfortably in bed    -      Cardiac: S1/S2, appreciated, RRR, no mumurs    -      Pulm: CTA b/l, No adventitious sounds    -      GI: Soft, NT, ND    -      Musculoskeletal: B/L LE Chronic venous stasis ulcer with     -      Neuro:         Labs:                        10.6   3.56  )-----------( 193      ( 25 May 2019 06:54 )             33.2             05-24    144  |  106  |  19  ----------------------------<  149<H>  5.1<H>   |  27  |  1.2    Ca    9.4      24 May 2019 15:01  Mg     2.4     05-24    TPro  6.8  /  Alb  3.7  /  TBili  0.4  /  DBili  x   /  AST  14  /  ALT  7   /  AlkPhos  71  05-24    LIVER FUNCTIONS - ( 24 May 2019 15:01 )  Alb: 3.7 g/dL / Pro: 6.8 g/dL / ALK PHOS: 71 U/L / ALT: 7 U/L / AST: 14 U/L / GGT: x                 PT/INR - ( 24 May 2019 15:20 )   PT: 12.70 sec;   INR: 1.11 ratio         PTT - ( 24 May 2019 15:20 )  PTT:32.2 sec        Imaging:    ECG: Patient is a 89y old  Female who was sent in by Dr Urban for IV abx for RLE venous stasis ulcer    Interval events: none    Today is hosp day 1  Patient is resting comfortably in bed   Severe dementia, constantly going off topics  Was only able to tell me her name  Denied RLE pain   Has not had Breakfast, will need 1:1 feeds   Plan: F/UP Vascular and ID recs, consult placed           PAST MEDICAL & SURGICAL HISTORY:  Chronic HFrEF (heart failure with reduced ejection fraction): EF 35%  Dementia  Urinary incontinence  PUD (peptic ulcer disease)  Thoracic aortic aneurysm without rupture  CKD (chronic kidney disease)  HLD (hyperlipidemia)  Afib  Osteoarthritis  Hypothyroid  HTN (hypertension)  H/O exploratory laparotomy: 1960s  History of thoracic aortic aneurysm repair: 2008      MEDICATIONS  (STANDING):  amiodarone    Tablet 200 milliGRAM(s) Oral daily  ampicillin/sulbactam  IVPB      ampicillin/sulbactam  IVPB 3 Gram(s) IV Intermittent every 6 hours  aspirin  chewable 81 milliGRAM(s) Oral daily  atorvastatin 10 milliGRAM(s) Oral at bedtime  chlorhexidine 4% Liquid 1 Application(s) Topical <User Schedule>  clindamycin IVPB      clindamycin IVPB 300 milliGRAM(s) IV Intermittent every 8 hours  docusate sodium 100 milliGRAM(s) Oral two times a day  enoxaparin Injectable 40 milliGRAM(s) SubCutaneous daily  furosemide    Tablet 20 milliGRAM(s) Oral daily  labetalol 100 milliGRAM(s) Oral two times a day  levothyroxine 50 MICROGram(s) Oral daily  losartan 100 milliGRAM(s) Oral daily  oxybutynin 5 milliGRAM(s) Oral daily    MEDICATIONS  (PRN):  acetaminophen   Tablet .. 650 milliGRAM(s) Oral every 6 hours PRN Mild Pain (1 - 3)  aluminum hydroxide/magnesium hydroxide/simethicone Suspension 30 milliLiter(s) Oral every 6 hours PRN Dyspepsia  clonazePAM  Tablet 0.5 milliGRAM(s) Oral daily PRN anxiety/agitation          Vital Signs Last 24 Hrs  T(C): 37.1 (25 May 2019 04:56), Max: 37.1 (24 May 2019 20:40)  T(F): 98.7 (25 May 2019 04:56), Max: 98.7 (24 May 2019 20:40)  HR: 69 (25 May 2019 06:27) (69 - 77)  BP: 110/54 (25 May 2019 06:27) (110/54 - 176/80)  BP(mean): --  RR: 18 (25 May 2019 04:56) (18 - 20)  SpO2: 96% (24 May 2019 15:23) (95% - 96%)  CAPILLARY BLOOD GLUCOSE        I&O's Summary      Physical Exam:    -     General : NAD, resting comfortably in bed    -      Cardiac: S1/S2, appreciated, RRR, no murmurs    -      Pulm: CTA b/l, No adventitious sounds    -      GI: Soft, NT, ND    -      Musculoskeletal: B/L LE Chronic venous stasis ulcer, RLE with dressing in place,  looks clean, no oozing               * no edema      -      Neuro: AO x 1         Labs:                        10.6   3.56  )-----------( 193      ( 25 May 2019 06:54 )             33.2             05-24    144  |  106  |  19  ----------------------------<  149<H>  5.1<H>   |  27  |  1.2    Ca    9.4      24 May 2019 15:01  Mg     2.4     05-24    TPro  6.8  /  Alb  3.7  /  TBili  0.4  /  DBili  x   /  AST  14  /  ALT  7   /  AlkPhos  71  05-24    LIVER FUNCTIONS - ( 24 May 2019 15:01 )  Alb: 3.7 g/dL / Pro: 6.8 g/dL / ALK PHOS: 71 U/L / ALT: 7 U/L / AST: 14 U/L / GGT: x                 PT/INR - ( 24 May 2019 15:20 )   PT: 12.70 sec;   INR: 1.11 ratio         PTT - ( 24 May 2019 15:20 )  PTT:32.2 sec        Imaging:    ECG:

## 2019-05-25 NOTE — PROGRESS NOTE ADULT - ASSESSMENT
89 yo F w/ multiple comorbidities including advanced dementia, sent in from [facility] by Dr Urban for IV abx treatment for infected RLE venous stasis ulcer. In ED, VS stable    Chronic RLE venous stasis ulcer with Cellulitis  -Ulcer looks   -Leukopenic, Afebrile, Vitals WNL on admission  -c/w  IV Unasyn + IV clindamycin  -F/UP ID and Vascular Consult and Recs.   - Tylenol prn for pain control    History of AFIB  - High CHADVASC- 5  -Not on AC, likely secondary to High fall risk  from Advanced Dementia    - c/w BB, amiodarone    Essential HTN: Uncontrolled   -c/w losartan and labetalol    Hypothyroidism appears stable  -c/w synthroid    Urinary Incontinence   -c/w Oxybutynin     CKD3:  stable    DLD  -On Lipitor 10mg  -will d/w  attending about stopping,  risk VS Benefit with advanced age and Severe dementia     HFrEF: euvolemic on exam  - last Echo EF 35%: 5/6/18  - c/w Lasix,  BB, Losartan     PUD  c/w Maalox prn for dyspepsia     AAA--> CT Angio Abdomen and Pelvis w/ IV Cont (01.28.19 @ 13:48)  1. Marked aneurysmal dilation of ascending thoracic aorta, 7.4 x 7 cm.   Aortic arch dilated up to 4.2 cm, descending thoracic aorta up to 3.5 cm.   - based on CT surgery eval on last admission--> c/w conservative management, not a surgical candidate    DVT PPx--> Lovenox SQ  GI PPX: Not indicated     Dispo:  from [facility],     Code status- DNR- based on SNF papers,   -HCP paper also in chart 89 yo F w/ multiple comorbidities including advanced dementia, sent in from [facility] by Dr Urban for IV abx treatment for infected RLE venous stasis ulcer. In ED, VS stable    Chronic RLE venous stasis ulcer with possible Cellulitis  -Leukopenic, Afebrile, Vitals WNL on admission  -c/w  IV Unasyn + IV clindamycin  -F/UP ID and Vascular Consult and Recs.   - Tylenol prn for pain control    History of AFIB  - High CHADVASC- 5  -Not on AC, likely secondary to High fall risk  from Advanced Dementia    - c/w BB, amiodarone    Essential HTN: Uncontrolled   -c/w losartan and labetalol    Hypothyroidism appears stable  -c/w synthroid    Urinary Incontinence   -c/w Oxybutynin     CKD3:  stable    DLD  -On Lipitor 10mg  -will d/w  attending about discontinuing       *Risks vs Benefit     HFrEF: euvolemic on exam  - last Echo EF 35%: 5/6/18  - c/w Lasix,  BB, Losartan     PUD  c/w Maalox prn for dyspepsia     AAA--> CT Angio Abdomen and Pelvis w/ IV Cont (01.28.19 @ 13:48)  1. Marked aneurysmal dilation of ascending thoracic aorta, 7.4 x 7 cm.   Aortic arch dilated up to 4.2 cm, descending thoracic aorta up to 3.5 cm.   - based on CT surgery eval on last admission--> c/w conservative management, not a surgical candidate    DVT PPx--> Lovenox SQ  GI PPX: Not indicated     Dispo:  from [facility],     Code status- DNR- based on SNF papers,   -HCP paper also in chart

## 2019-05-25 NOTE — CONSULT NOTE ADULT - SUBJECTIVE AND OBJECTIVE BOX
89y  Female    morphine (Unknown)  oxycodone (Unknown)        acetaminophen   Tablet .. 650 milliGRAM(s) Oral every 6 hours PRN  aluminum hydroxide/magnesium hydroxide/simethicone Suspension 30 milliLiter(s) Oral every 6 hours PRN  amiodarone    Tablet 200 milliGRAM(s) Oral daily  ampicillin/sulbactam  IVPB      ampicillin/sulbactam  IVPB 3 Gram(s) IV Intermittent every 6 hours  aspirin  chewable 81 milliGRAM(s) Oral daily  atorvastatin 10 milliGRAM(s) Oral at bedtime  chlorhexidine 4% Liquid 1 Application(s) Topical <User Schedule>  clindamycin IVPB      clindamycin IVPB 300 milliGRAM(s) IV Intermittent every 8 hours  clonazePAM  Tablet 0.5 milliGRAM(s) Oral daily PRN  docusate sodium 100 milliGRAM(s) Oral two times a day  enoxaparin Injectable 40 milliGRAM(s) SubCutaneous daily  furosemide    Tablet 20 milliGRAM(s) Oral daily  labetalol 100 milliGRAM(s) Oral two times a day  levothyroxine 50 MICROGram(s) Oral daily  losartan 100 milliGRAM(s) Oral daily  oxybutynin 5 milliGRAM(s) Oral daily    ampicillin/sulbactam  IVPB      ampicillin/sulbactam  IVPB 3 Gram(s) IV Intermittent every 6 hours  clindamycin IVPB      clindamycin IVPB 300 milliGRAM(s) IV Intermittent every 8 hours      T(C): 36.1 (05-25-19 @ 13:05), Max: 37.1 (05-24-19 @ 20:40)  HR: 72 (05-25-19 @ 13:05) (69 - 77)  BP: 135/64 (05-25-19 @ 13:05) (110/54 - 176/80)  RR: 18 (05-25-19 @ 13:05) (18 - 18)  SpO2: --                            10.6   3.56  )-----------( 193      ( 25 May 2019 06:54 )             33.2       05-25    143  |  104  |  14  ----------------------------<  92  4.3   |  28  |  1.0    Ca    8.9      25 May 2019 06:54  Mg     2.4     05-24    TPro  6.8  /  Alb  3.7  /  TBili  0.4  /  DBili  x   /  AST  14  /  ALT  7   /  AlkPhos  71  05-24 HPI:     89 yo Female with multiple comorbidities including advanced dementia, sent in from [facility] by Dr Urban for evaluation of infected RLE ulcer. Patient unable to provide any history due to advanced dementia.     No fevers or rigors.     PAST MEDICAL & SURGICAL HISTORY:  Chronic HFrEF (heart failure with reduced ejection fraction): EF 35%  Dementia, advanced   Urinary incontinence  PUD (peptic ulcer disease)  Thoracic aortic aneurysm without rupture  CKD (chronic kidney disease)  HLD (hyperlipidemia)  Afib  Osteoarthritis  Hypothyroid  HTN (hypertension)  H/O exploratory laparotomy: 1960s  History of thoracic aortic aneurysm repair:     morphine (Unknown)  oxycodone (Unknown)      acetaminophen   Tablet .. 650 milliGRAM(s) Oral every 6 hours PRN  aluminum hydroxide/magnesium hydroxide/simethicone Suspension 30 milliLiter(s) Oral every 6 hours PRN  amiodarone    Tablet 200 milliGRAM(s) Oral daily  ampicillin/sulbactam  IVPB      ampicillin/sulbactam  IVPB 3 Gram(s) IV Intermittent every 6 hours  aspirin  chewable 81 milliGRAM(s) Oral daily  atorvastatin 10 milliGRAM(s) Oral at bedtime  chlorhexidine 4% Liquid 1 Application(s) Topical <User Schedule>  clindamycin IVPB      clindamycin IVPB 300 milliGRAM(s) IV Intermittent every 8 hours  clonazePAM  Tablet 0.5 milliGRAM(s) Oral daily PRN  docusate sodium 100 milliGRAM(s) Oral two times a day  enoxaparin Injectable 40 milliGRAM(s) SubCutaneous daily  furosemide    Tablet 20 milliGRAM(s) Oral daily  labetalol 100 milliGRAM(s) Oral two times a day  levothyroxine 50 MICROGram(s) Oral daily  losartan 100 milliGRAM(s) Oral daily  oxybutynin 5 milliGRAM(s) Oral daily    ampicillin/sulbactam  IVPB      ampicillin/sulbactam  IVPB 3 Gram(s) IV Intermittent every 6 hours  clindamycin IVPB      clindamycin IVPB 300 milliGRAM(s) IV Intermittent every 8 hours      T(C): 36.1 (19 @ 13:05), Max: 37.1 (19 @ 20:40)  HR: 72 (19 @ 13:05) (69 - 77)  BP: 135/64 (19 @ 13:05) (110/54 - 176/80)  RR: 18 (-19 @ 13:05) (18 - 18)  SpO2: --                            10.6   3.56  )-----------( 193      ( 25 May 2019 06:54 )             33.2           143  |  104  |  14  ----------------------------<  92  4.3   |  28  |  1.0    Ca    8.9      25 May 2019 06:54  Mg     2.4         TPro  6.8  /  Alb  3.7  /  TBili  0.4  /  DBili  x   /  AST  14  /  ALT  7   /  AlkPhos  71          EXAM:  XR TIB FIB AP LAT 2 VIEWS RT          PROCEDURE DATE:  2019      INTERPRETATION:  Clinical History / Reason for exam: Pain.    2 views.    Comparison: 2015    Findings/  impression: No acute fracture, dislocation ordestructive lesion. Knee   degenerative arthritis. Soft tissue vascular calcification..      EXAM:  XR CHEST PORTABLE IMMED 1V        PROCEDURE DATE:  2019      INTERPRETATION:  Clinical History / Reason for exam: Cellulitis    Comparison : Chest radiograph 2019.    Technique/Positionin frontal views with the patient's chin obscuring   the superior mediastinum and medial lung apices..    Findings:  Support devices: Stable positioning left ICD...  Cardiac/mediastinum/hilum: Cardiomegaly, thoracic aortic ectasia.. Status   post median sternotomy.  Lungparenchyma/Pleura: Bibasilar focal atelectasis..  Skeleton/soft tissues: Stable.  Impression:    Bibasilar focal atelectasis. Stable cardiomegaly. HPI:     89 yo Female with multiple comorbidities (see below)  including advanced dementia and atrial fibrillation sent in from [facility] by Dr Urban for evaluation of infected RLE ulcer. Patient unable to provide any history due to advanced dementia.     No fevers or rigors.     PAST MEDICAL & SURGICAL HISTORY:  Chronic HFrEF (heart failure with reduced ejection fraction): EF 35%  Dementia, advanced   Urinary incontinence  PUD (peptic ulcer disease)  Thoracic aortic aneurysm without rupture  CKD (chronic kidney disease)  HLD (hyperlipidemia)  Afib  Osteoarthritis  Hypothyroid  HTN (hypertension)  H/O exploratory laparotomy: 1960s  History of thoracic aortic aneurysm repair:     morphine (Unknown)  oxycodone (Unknown)    Alert, confused, fragile appearing, s1s2, no wheezing, abd soft, RLE- 3x4 cm ulcer, with serosanguinous discharge.     acetaminophen   Tablet .. 650 milliGRAM(s) Oral every 6 hours PRN  aluminum hydroxide/magnesium hydroxide/simethicone Suspension 30 milliLiter(s) Oral every 6 hours PRN  amiodarone    Tablet 200 milliGRAM(s) Oral daily  ampicillin/sulbactam  IVPB      ampicillin/sulbactam  IVPB 3 Gram(s) IV Intermittent every 6 hours  aspirin  chewable 81 milliGRAM(s) Oral daily  atorvastatin 10 milliGRAM(s) Oral at bedtime  chlorhexidine 4% Liquid 1 Application(s) Topical <User Schedule>  clindamycin IVPB      clindamycin IVPB 300 milliGRAM(s) IV Intermittent every 8 hours  clonazePAM  Tablet 0.5 milliGRAM(s) Oral daily PRN  docusate sodium 100 milliGRAM(s) Oral two times a day  enoxaparin Injectable 40 milliGRAM(s) SubCutaneous daily  furosemide    Tablet 20 milliGRAM(s) Oral daily  labetalol 100 milliGRAM(s) Oral two times a day  levothyroxine 50 MICROGram(s) Oral daily  losartan 100 milliGRAM(s) Oral daily  oxybutynin 5 milliGRAM(s) Oral daily    ampicillin/sulbactam  IVPB      ampicillin/sulbactam  IVPB 3 Gram(s) IV Intermittent every 6 hours  clindamycin IVPB      clindamycin IVPB 300 milliGRAM(s) IV Intermittent every 8 hours      T(C): 36.1 (19 @ 13:05), Max: 37.1 (19 @ 20:40)  HR: 72 (19 @ 13:05) (69 - 77)  BP: 135/64 (19 @ 13:05) (110/54 - 176/80)  RR: 18 (19 @ 13:05) (18 - 18)  SpO2: --                            10.6   3.56  )-----------( 193      ( 25 May 2019 06:54 )             33.2           143  |  104  |  14  ----------------------------<  92  4.3   |  28  |  1.0    Ca    8.9      25 May 2019 06:54  Mg     2.4         TPro  6.8  /  Alb  3.7  /  TBili  0.4  /  DBili  x   /  AST  14  /  ALT  7   /  AlkPhos  71          EXAM:  XR TIB FIB AP LAT 2 VIEWS RT          PROCEDURE DATE:  2019      INTERPRETATION:  Clinical History / Reason for exam: Pain.    2 views.    Comparison: 2015    Findings/  impression: No acute fracture, dislocation ordestructive lesion. Knee   degenerative arthritis. Soft tissue vascular calcification..      EXAM:  XR CHEST PORTABLE IMMED 1V        PROCEDURE DATE:  2019      INTERPRETATION:  Clinical History / Reason for exam: Cellulitis    Comparison : Chest radiograph 2019.    Technique/Positionin frontal views with the patient's chin obscuring   the superior mediastinum and medial lung apices..    Findings:  Support devices: Stable positioning left ICD...  Cardiac/mediastinum/hilum: Cardiomegaly, thoracic aortic ectasia.. Status   post median sternotomy.  Lungparenchyma/Pleura: Bibasilar focal atelectasis..  Skeleton/soft tissues: Stable.  Impression:    Bibasilar focal atelectasis. Stable cardiomegaly.

## 2019-05-25 NOTE — CONSULT NOTE ADULT - ASSESSMENT
Vascular surgery was consulted for possible debridement of necrotic tissue around the venous stasis ulcers. 89 yo Female with multiple comorbidities including advanced dementia, atrial fibrillation, h/o CKD, chronic congestive heart failure:     # RLE ulcer, cellulitis:   - C/W Clindamycin. CAN switch to oral Clindamycin 300 mg q8 at discharge.   - Added Vantin 200 mg q12.   - Add acidophilus.   - No debridement per vascular.   - Duration of antibiotics 7 days.

## 2019-05-25 NOTE — PROGRESS NOTE ADULT - ASSESSMENT
Celullitis RLE  Stasis ulcer  PVD  advanced dementia        IV Abx  ID eval Dr Ku....notified  Vascular surg eval  Possibly SNF placement, depending on what family says.

## 2019-05-26 RX ORDER — COLLAGENASE CLOSTRIDIUM HIST. 250 UNIT/G
1 OINTMENT (GRAM) TOPICAL DAILY
Refills: 0 | Status: DISCONTINUED | OUTPATIENT
Start: 2019-05-26 | End: 2019-05-30

## 2019-05-26 RX ADMIN — Medication 100 MILLIGRAM(S): at 06:12

## 2019-05-26 RX ADMIN — ATORVASTATIN CALCIUM 10 MILLIGRAM(S): 80 TABLET, FILM COATED ORAL at 21:17

## 2019-05-26 RX ADMIN — Medication 5 MILLIGRAM(S): at 11:14

## 2019-05-26 RX ADMIN — LOSARTAN POTASSIUM 100 MILLIGRAM(S): 100 TABLET, FILM COATED ORAL at 06:13

## 2019-05-26 RX ADMIN — Medication 100 MILLIGRAM(S): at 13:22

## 2019-05-26 RX ADMIN — Medication 81 MILLIGRAM(S): at 11:14

## 2019-05-26 RX ADMIN — Medication 200 MILLIGRAM(S): at 06:12

## 2019-05-26 RX ADMIN — CHLORHEXIDINE GLUCONATE 1 APPLICATION(S): 213 SOLUTION TOPICAL at 06:14

## 2019-05-26 RX ADMIN — Medication 50 MICROGRAM(S): at 06:12

## 2019-05-26 RX ADMIN — Medication 100 MILLIGRAM(S): at 17:33

## 2019-05-26 RX ADMIN — Medication 100 MILLIGRAM(S): at 21:16

## 2019-05-26 RX ADMIN — ENOXAPARIN SODIUM 40 MILLIGRAM(S): 100 INJECTION SUBCUTANEOUS at 11:14

## 2019-05-26 RX ADMIN — Medication 20 MILLIGRAM(S): at 06:12

## 2019-05-26 RX ADMIN — AMIODARONE HYDROCHLORIDE 200 MILLIGRAM(S): 400 TABLET ORAL at 06:12

## 2019-05-26 RX ADMIN — Medication 200 MILLIGRAM(S): at 17:33

## 2019-05-26 RX ADMIN — Medication 1 APPLICATION(S): at 12:22

## 2019-05-26 NOTE — PROGRESS NOTE ADULT - SUBJECTIVE AND OBJECTIVE BOX
Pt seen and preceding noted  ID and Vascular eval appreciated  Pt is more awake and communicative today.  Dementia unchanged  PO Vantin added by ID      SOCIAL HISTORY: n/a      MEDICATIONS  (STANDING):  amiodarone    Tablet 200 milliGRAM(s) Oral daily  aspirin  chewable 81 milliGRAM(s) Oral daily  atorvastatin 10 milliGRAM(s) Oral at bedtime  cefpodoxime 200 milliGRAM(s) Oral every 12 hours  chlorhexidine 4% Liquid 1 Application(s) Topical <User Schedule>  clindamycin IVPB      clindamycin IVPB 300 milliGRAM(s) IV Intermittent every 8 hours  collagenase Ointment 1 Application(s) Topical daily  docusate sodium 100 milliGRAM(s) Oral two times a day  enoxaparin Injectable 40 milliGRAM(s) SubCutaneous daily  furosemide    Tablet 20 milliGRAM(s) Oral daily  labetalol 100 milliGRAM(s) Oral two times a day  levothyroxine 50 MICROGram(s) Oral daily  losartan 100 milliGRAM(s) Oral daily  oxybutynin 5 milliGRAM(s) Oral daily    MEDICATIONS  (PRN):  acetaminophen   Tablet .. 650 milliGRAM(s) Oral every 6 hours PRN Mild Pain (1 - 3)  aluminum hydroxide/magnesium hydroxide/simethicone Suspension 30 milliLiter(s) Oral every 6 hours PRN Dyspepsia  clonazePAM  Tablet 0.5 milliGRAM(s) Oral daily PRN anxiety/agitation      Vital Signs Last 24 Hrs  T(C): 36.1 (26 May 2019 05:10), Max: 36.9 (25 May 2019 22:16)  T(F): 97 (26 May 2019 05:10), Max: 98.4 (25 May 2019 22:16)  HR: 80 (26 May 2019 05:10) (70 - 80)  BP: 137/68 (26 May 2019 05:10) (135/64 - 166/69)  BP(mean): --  RR: 18 (26 May 2019 05:10) (18 - 18)  SpO2: --    PHYSICAL EXAM:    Constitutional: NAD, well-groomed, well-developed  HEENT: PERRLA, EOMI, Normal Hearing, MMM  Neck: No LAD, No JVD  Back: Normal spine flexure, No CVA tenderness  Respiratory: CTAB/L  Cardiovascular: S1 and S2, RRR, no M/G/R  Gastrointestinal: BS+, soft, NT/ND  Extremities: No peripheral edema  Vascular: 2+ peripheral pulses  Neurological: A/O x 3, no focal deficits    LABS:                        10.6   3.56  )-----------( 193      ( 25 May 2019 06:54 )             33.2     05-25    143  |  104  |  14  ----------------------------<  92  4.3   |  28  |  1.0    Ca    8.9      25 May 2019 06:54  Mg     2.4     05-24    TPro  6.8  /  Alb  3.7  /  TBili  0.4  /  DBili  x   /  AST  14  /  ALT  7   /  AlkPhos  71  05-24    PT/INR - ( 24 May 2019 15:20 )   PT: 12.70 sec;   INR: 1.11 ratio         PTT - ( 24 May 2019 15:20 )  PTT:32.2 sec    Drug Screen Urine:  Alcohol Level  n/a        RADIOLOGY & ADDITIONAL STUDIES:  n/a Pt seen and preceding noted  ID and Vascular eval appreciated  Pt is more awake and communicative today.  Dementia unchanged  PO Vantin added by ID      SOCIAL HISTORY: n/a      MEDICATIONS  (STANDING):  amiodarone    Tablet 200 milliGRAM(s) Oral daily  aspirin  chewable 81 milliGRAM(s) Oral daily  atorvastatin 10 milliGRAM(s) Oral at bedtime  cefpodoxime 200 milliGRAM(s) Oral every 12 hours  chlorhexidine 4% Liquid 1 Application(s) Topical <User Schedule>  clindamycin IVPB      clindamycin IVPB 300 milliGRAM(s) IV Intermittent every 8 hours  collagenase Ointment 1 Application(s) Topical daily  docusate sodium 100 milliGRAM(s) Oral two times a day  enoxaparin Injectable 40 milliGRAM(s) SubCutaneous daily  furosemide    Tablet 20 milliGRAM(s) Oral daily  labetalol 100 milliGRAM(s) Oral two times a day  levothyroxine 50 MICROGram(s) Oral daily  losartan 100 milliGRAM(s) Oral daily  oxybutynin 5 milliGRAM(s) Oral daily    MEDICATIONS  (PRN):  acetaminophen   Tablet .. 650 milliGRAM(s) Oral every 6 hours PRN Mild Pain (1 - 3)  aluminum hydroxide/magnesium hydroxide/simethicone Suspension 30 milliLiter(s) Oral every 6 hours PRN Dyspepsia  clonazePAM  Tablet 0.5 milliGRAM(s) Oral daily PRN anxiety/agitation      Vital Signs Last 24 Hrs  T(C): 36.1 (26 May 2019 05:10), Max: 36.9 (25 May 2019 22:16)  T(F): 97 (26 May 2019 05:10), Max: 98.4 (25 May 2019 22:16)  HR: 80 (26 May 2019 05:10) (70 - 80)  BP: 137/68 (26 May 2019 05:10) (135/64 - 166/69)  BP(mean): --  RR: 18 (26 May 2019 05:10) (18 - 18)  SpO2: --    PHYSICAL EXAM:    Constitutional: NAD, well-groomed, well-developed  HEENT: PERRLA, EOMI, Normal Hearing, MMM  Neck: No LAD, No JVD  Back: Normal spine flexure, No CVA tenderness  Respiratory: CTAB/L  Cardiovascular: S1 and S2, RRR, no M/G/R  Gastrointestinal: BS+, soft, NT/ND  Extremities: No peripheral edema  Vascular:  Neurological: , no focal deficits    LABS:                        10.6   3.56  )-----------( 193      ( 25 May 2019 06:54 )             33.2     05-25    143  |  104  |  14  ----------------------------<  92  4.3   |  28  |  1.0    Ca    8.9      25 May 2019 06:54  Mg     2.4     05-24    TPro  6.8  /  Alb  3.7  /  TBili  0.4  /  DBili  x   /  AST  14  /  ALT  7   /  AlkPhos  71  05-24    PT/INR - ( 24 May 2019 15:20 )   PT: 12.70 sec;   INR: 1.11 ratio         PTT - ( 24 May 2019 15:20 )  PTT:32.2 sec    Drug Screen Urine:  Alcohol Level  n/a        RADIOLOGY & ADDITIONAL STUDIES:  n/a

## 2019-05-26 NOTE — PROGRESS NOTE ADULT - ASSESSMENT
Stasis Ulcer LE  celullitis RLE  Advanced dementia      Continue current regimen  will speak with daughter Nohelia, since she had mentioned getting a hospice consult when the pt was at [facility]

## 2019-05-27 RX ORDER — LACTOBACILLUS ACIDOPHILUS 100MM CELL
1 CAPSULE ORAL DAILY
Refills: 0 | Status: DISCONTINUED | OUTPATIENT
Start: 2019-05-27 | End: 2019-05-30

## 2019-05-27 RX ADMIN — Medication 100 MILLIGRAM(S): at 06:53

## 2019-05-27 RX ADMIN — Medication 20 MILLIGRAM(S): at 06:53

## 2019-05-27 RX ADMIN — Medication 200 MILLIGRAM(S): at 17:33

## 2019-05-27 RX ADMIN — CHLORHEXIDINE GLUCONATE 1 APPLICATION(S): 213 SOLUTION TOPICAL at 06:53

## 2019-05-27 RX ADMIN — Medication 100 MILLIGRAM(S): at 17:33

## 2019-05-27 RX ADMIN — Medication 5 MILLIGRAM(S): at 12:12

## 2019-05-27 RX ADMIN — LOSARTAN POTASSIUM 100 MILLIGRAM(S): 100 TABLET, FILM COATED ORAL at 06:54

## 2019-05-27 RX ADMIN — Medication 50 MICROGRAM(S): at 06:53

## 2019-05-27 RX ADMIN — ATORVASTATIN CALCIUM 10 MILLIGRAM(S): 80 TABLET, FILM COATED ORAL at 21:38

## 2019-05-27 RX ADMIN — Medication 1 APPLICATION(S): at 12:12

## 2019-05-27 RX ADMIN — AMIODARONE HYDROCHLORIDE 200 MILLIGRAM(S): 400 TABLET ORAL at 06:53

## 2019-05-27 RX ADMIN — ENOXAPARIN SODIUM 40 MILLIGRAM(S): 100 INJECTION SUBCUTANEOUS at 12:12

## 2019-05-27 RX ADMIN — Medication 100 MILLIGRAM(S): at 14:44

## 2019-05-27 RX ADMIN — Medication 81 MILLIGRAM(S): at 12:12

## 2019-05-27 RX ADMIN — Medication 1 TABLET(S): at 18:27

## 2019-05-27 RX ADMIN — Medication 100 MILLIGRAM(S): at 21:38

## 2019-05-27 RX ADMIN — Medication 200 MILLIGRAM(S): at 06:54

## 2019-05-27 NOTE — PROGRESS NOTE ADULT - ASSESSMENT
89 yo F w/ multiple comorbidities including advanced dementia, sent in from [facility] by Dr Urban for IV abx treatment for infected RLE venous stasis ulcer. In ED, VS stable    Chronic RLE venous stasis ulcer with possible Cellulitis  -Leukopenic, Afebrile, Vitals WNL on admission  -RLE covered in dressing: did not Access  will check back before next dressing change   -ID recs Noted      *c/w IV Clindamycin and Vantin: complete 7 days course      * Add Acidophilus: added  - Per Vascular: no intervention O/P F/Up  - Tylenol prn for pain control    History of AFIB  - High CHADVASC- 5  -Not on AC, likely secondary to High fall risk  from Advanced Dementia    - c/w BB, amiodarone    Essential HTN: Uncontrolled   -c/w losartan and labetalol    Hypothyroidism appears stable  -c/w synthroid    Urinary Incontinence   -c/w Oxybutynin     CKD3:  stable    DLD  -On Lipitor 10mg  -will d/w  attending about discontinuing       *Risks vs Benefit     HFrEF: euvolemic on exam  - last Echo EF 35%: 5/6/18  - c/w Lasix,  BB, Losartan     PUD  c/w Maalox prn for dyspepsia     AAA--> CT Angio Abdomen and Pelvis w/ IV Cont (01.28.19 @ 13:48)  1. Marked aneurysmal dilation of ascending thoracic aorta, 7.4 x 7 cm.   Aortic arch dilated up to 4.2 cm, descending thoracic aorta up to 3.5 cm.   - based on CT surgery eval on last admission--> c/w conservative management, not a surgical candidate    DVT PPx--> Lovenox SQ  GI PPX: Not indicated     Dispo:  from [facility],     Code status- DNR- based on SNF papers,   -HCP paper also in chart

## 2019-05-27 NOTE — PROGRESS NOTE ADULT - SUBJECTIVE AND OBJECTIVE BOX
Pt seen, no untoward event past 24 hrs  ON IV Abx  afebrile  On santyl dressing per vascular team  dementia unchanged          MEDICATIONS  (STANDING):  amiodarone    Tablet 200 milliGRAM(s) Oral daily  aspirin  chewable 81 milliGRAM(s) Oral daily  atorvastatin 10 milliGRAM(s) Oral at bedtime  cefpodoxime 200 milliGRAM(s) Oral every 12 hours  chlorhexidine 4% Liquid 1 Application(s) Topical <User Schedule>  clindamycin IVPB      clindamycin IVPB 300 milliGRAM(s) IV Intermittent every 8 hours  collagenase Ointment 1 Application(s) Topical daily  docusate sodium 100 milliGRAM(s) Oral two times a day  enoxaparin Injectable 40 milliGRAM(s) SubCutaneous daily  furosemide    Tablet 20 milliGRAM(s) Oral daily  labetalol 100 milliGRAM(s) Oral two times a day  levothyroxine 50 MICROGram(s) Oral daily  losartan 100 milliGRAM(s) Oral daily  oxybutynin 5 milliGRAM(s) Oral daily    MEDICATIONS  (PRN):  acetaminophen   Tablet .. 650 milliGRAM(s) Oral every 6 hours PRN Mild Pain (1 - 3)  aluminum hydroxide/magnesium hydroxide/simethicone Suspension 30 milliLiter(s) Oral every 6 hours PRN Dyspepsia  clonazePAM  Tablet 0.5 milliGRAM(s) Oral daily PRN anxiety/agitation      Vital Signs Last 24 Hrs  T(C): 35.6 (27 May 2019 05:00), Max: 36.6 (26 May 2019 13:16)  T(F): 96 (27 May 2019 05:00), Max: 97.9 (26 May 2019 21:49)  HR: 70 (27 May 2019 05:00) (65 - 70)  BP: 170/67 (27 May 2019 05:00) (139/80 - 170/70)  BP(mean): --  RR: 18 (27 May 2019 05:00) (18 - 18)  SpO2: --      LABS:              Drug Screen Urine:  Alcohol Level n/a        RADIOLOGY & ADDITIONAL STUDIES: n/a

## 2019-05-27 NOTE — PROGRESS NOTE ADULT - ASSESSMENT
Stasis Ulcer LE  celullitis  resolving  advanced dementia      Continue current regimen  hospice consult pending  hope to d/c by Wed.

## 2019-05-27 NOTE — PROGRESS NOTE ADULT - SUBJECTIVE AND OBJECTIVE BOX
Patient is a 89y old  Female who presents with a chief complaint of sent in by Dr Urban for IV abx for RLE venous stasis ulcer (27 May 2019 10:26)      Interval events:    PAST MEDICAL & SURGICAL HISTORY:  Chronic HFrEF (heart failure with reduced ejection fraction): EF 35%  Dementia  Urinary incontinence  PUD (peptic ulcer disease)  Thoracic aortic aneurysm without rupture  CKD (chronic kidney disease)  HLD (hyperlipidemia)  Afib  Osteoarthritis  Hypothyroid  HTN (hypertension)  H/O exploratory laparotomy: 1960s  History of thoracic aortic aneurysm repair: 2008      MEDICATIONS  (STANDING):  amiodarone    Tablet 200 milliGRAM(s) Oral daily  aspirin  chewable 81 milliGRAM(s) Oral daily  atorvastatin 10 milliGRAM(s) Oral at bedtime  cefpodoxime 200 milliGRAM(s) Oral every 12 hours  chlorhexidine 4% Liquid 1 Application(s) Topical <User Schedule>  clindamycin IVPB      clindamycin IVPB 300 milliGRAM(s) IV Intermittent every 8 hours  collagenase Ointment 1 Application(s) Topical daily  docusate sodium 100 milliGRAM(s) Oral two times a day  enoxaparin Injectable 40 milliGRAM(s) SubCutaneous daily  furosemide    Tablet 20 milliGRAM(s) Oral daily  labetalol 100 milliGRAM(s) Oral two times a day  levothyroxine 50 MICROGram(s) Oral daily  losartan 100 milliGRAM(s) Oral daily  oxybutynin 5 milliGRAM(s) Oral daily    MEDICATIONS  (PRN):  acetaminophen   Tablet .. 650 milliGRAM(s) Oral every 6 hours PRN Mild Pain (1 - 3)  aluminum hydroxide/magnesium hydroxide/simethicone Suspension 30 milliLiter(s) Oral every 6 hours PRN Dyspepsia  clonazePAM  Tablet 0.5 milliGRAM(s) Oral daily PRN anxiety/agitation          Vital Signs Last 24 Hrs  T(C): 37.1 (27 May 2019 11:53), Max: 37.1 (27 May 2019 11:53)  T(F): 98.7 (27 May 2019 11:53), Max: 98.7 (27 May 2019 11:53)  HR: 70 (27 May 2019 11:53) (70 - 70)  BP: 135/58 (27 May 2019 11:53) (135/58 - 170/67)  BP(mean): --  RR: 18 (27 May 2019 11:53) (18 - 18)  SpO2: --  CAPILLARY BLOOD GLUCOSE        I&O's Summary    26 May 2019 07:01  -  27 May 2019 07:00  --------------------------------------------------------  IN: 100 mL / OUT: 0 mL / NET: 100 mL        Physical Exam:    -     General :     -      Cardiac:    -      Pulm:    -      GI:    -      Musculoskeletal:    -      Neuro:        Labs:                                        Imaging:    ECG: Patient is a 89y old  Female who presents with a chief complaint of sent in by Dr Urban for IV abx for RLE venous stasis ulcer (27 May 2019 10:26)    Interval events: none    Today is hosp day 3  Patient is resting comfortably in bed   Severe dementia, Was only able to tell me her name, Denied RLE pain   Tolerating diet, on 1:1 feeds, but not eating much    Plan: C/w Antibiotics and wound care   -D/c planning in progress    PAST MEDICAL & SURGICAL HISTORY:  Chronic HFrEF (heart failure with reduced ejection fraction): EF 35%  Dementia  Urinary incontinence  PUD (peptic ulcer disease)  Thoracic aortic aneurysm without rupture  CKD (chronic kidney disease)  HLD (hyperlipidemia)  Afib  Osteoarthritis  Hypothyroid  HTN (hypertension)  H/O exploratory laparotomy: 1960s  History of thoracic aortic aneurysm repair: 2008      MEDICATIONS  (STANDING):  amiodarone    Tablet 200 milliGRAM(s) Oral daily  aspirin  chewable 81 milliGRAM(s) Oral daily  atorvastatin 10 milliGRAM(s) Oral at bedtime  cefpodoxime 200 milliGRAM(s) Oral every 12 hours  chlorhexidine 4% Liquid 1 Application(s) Topical <User Schedule>  clindamycin IVPB      clindamycin IVPB 300 milliGRAM(s) IV Intermittent every 8 hours  collagenase Ointment 1 Application(s) Topical daily  docusate sodium 100 milliGRAM(s) Oral two times a day  enoxaparin Injectable 40 milliGRAM(s) SubCutaneous daily  furosemide    Tablet 20 milliGRAM(s) Oral daily  labetalol 100 milliGRAM(s) Oral two times a day  levothyroxine 50 MICROGram(s) Oral daily  losartan 100 milliGRAM(s) Oral daily  oxybutynin 5 milliGRAM(s) Oral daily    MEDICATIONS  (PRN):  acetaminophen   Tablet .. 650 milliGRAM(s) Oral every 6 hours PRN Mild Pain (1 - 3)  aluminum hydroxide/magnesium hydroxide/simethicone Suspension 30 milliLiter(s) Oral every 6 hours PRN Dyspepsia  clonazePAM  Tablet 0.5 milliGRAM(s) Oral daily PRN anxiety/agitation          Vital Signs Last 24 Hrs  T(C): 37.1 (27 May 2019 11:53), Max: 37.1 (27 May 2019 11:53)  T(F): 98.7 (27 May 2019 11:53), Max: 98.7 (27 May 2019 11:53)  HR: 70 (27 May 2019 11:53) (70 - 70)  BP: 135/58 (27 May 2019 11:53) (135/58 - 170/67)  BP(mean): --  RR: 18 (27 May 2019 11:53) (18 - 18)  SpO2: --  CAPILLARY BLOOD GLUCOSE        I&O's Summary    26 May 2019 07:01  -  27 May 2019 07:00  --------------------------------------------------------  IN: 100 mL / OUT: 0 mL / NET: 100 mL        Physical Exam:    --     General : NAD, resting comfortably in bed    -      Cardiac: S1/S2, appreciated, RRR, no murmurs    -      Pulm: CTA b/l, No adventitious sounds    -      GI: Soft, NT, ND    -      Musculoskeletal: B/L LE Chronic venous stasis ulcer, RLE with dressing in place,  looks clean, no oozing               * no edema      -   Neuro: AO x 1     Labs:                                        Imaging:    ECG:

## 2019-05-28 LAB
ALBUMIN SERPL ELPH-MCNC: 3.7 G/DL — SIGNIFICANT CHANGE UP (ref 3.5–5.2)
ALP SERPL-CCNC: 55 U/L — SIGNIFICANT CHANGE UP (ref 30–115)
ALT FLD-CCNC: 7 U/L — SIGNIFICANT CHANGE UP (ref 0–41)
ANION GAP SERPL CALC-SCNC: 10 MMOL/L — SIGNIFICANT CHANGE UP (ref 7–14)
AST SERPL-CCNC: 16 U/L — SIGNIFICANT CHANGE UP (ref 0–41)
BASOPHILS # BLD AUTO: 0.01 K/UL — SIGNIFICANT CHANGE UP (ref 0–0.2)
BASOPHILS NFR BLD AUTO: 0.4 % — SIGNIFICANT CHANGE UP (ref 0–1)
BILIRUB SERPL-MCNC: 0.5 MG/DL — SIGNIFICANT CHANGE UP (ref 0.2–1.2)
BUN SERPL-MCNC: 12 MG/DL — SIGNIFICANT CHANGE UP (ref 10–20)
CALCIUM SERPL-MCNC: 9.3 MG/DL — SIGNIFICANT CHANGE UP (ref 8.5–10.1)
CHLORIDE SERPL-SCNC: 102 MMOL/L — SIGNIFICANT CHANGE UP (ref 98–110)
CO2 SERPL-SCNC: 28 MMOL/L — SIGNIFICANT CHANGE UP (ref 17–32)
CREAT SERPL-MCNC: 1 MG/DL — SIGNIFICANT CHANGE UP (ref 0.7–1.5)
EOSINOPHIL # BLD AUTO: 0.09 K/UL — SIGNIFICANT CHANGE UP (ref 0–0.7)
EOSINOPHIL NFR BLD AUTO: 3.4 % — SIGNIFICANT CHANGE UP (ref 0–8)
GLUCOSE SERPL-MCNC: 98 MG/DL — SIGNIFICANT CHANGE UP (ref 70–99)
HCT VFR BLD CALC: 31.7 % — LOW (ref 37–47)
HGB BLD-MCNC: 10.5 G/DL — LOW (ref 12–16)
IMM GRANULOCYTES NFR BLD AUTO: 0 % — LOW (ref 0.1–0.3)
LYMPHOCYTES # BLD AUTO: 0.76 K/UL — LOW (ref 1.2–3.4)
LYMPHOCYTES # BLD AUTO: 28.8 % — SIGNIFICANT CHANGE UP (ref 20.5–51.1)
MAGNESIUM SERPL-MCNC: 2 MG/DL — SIGNIFICANT CHANGE UP (ref 1.8–2.4)
MCHC RBC-ENTMCNC: 33.1 G/DL — SIGNIFICANT CHANGE UP (ref 32–37)
MCHC RBC-ENTMCNC: 33.9 PG — HIGH (ref 27–31)
MCV RBC AUTO: 102.3 FL — HIGH (ref 81–99)
MONOCYTES # BLD AUTO: 0.47 K/UL — SIGNIFICANT CHANGE UP (ref 0.1–0.6)
MONOCYTES NFR BLD AUTO: 17.8 % — HIGH (ref 1.7–9.3)
NEUTROPHILS # BLD AUTO: 1.31 K/UL — LOW (ref 1.4–6.5)
NEUTROPHILS NFR BLD AUTO: 49.6 % — SIGNIFICANT CHANGE UP (ref 42.2–75.2)
NRBC # BLD: 0 /100 WBCS — SIGNIFICANT CHANGE UP (ref 0–0)
PHOSPHATE SERPL-MCNC: 3.7 MG/DL — SIGNIFICANT CHANGE UP (ref 2.1–4.9)
PLATELET # BLD AUTO: 166 K/UL — SIGNIFICANT CHANGE UP (ref 130–400)
POTASSIUM SERPL-MCNC: 4.1 MMOL/L — SIGNIFICANT CHANGE UP (ref 3.5–5)
POTASSIUM SERPL-SCNC: 4.1 MMOL/L — SIGNIFICANT CHANGE UP (ref 3.5–5)
PROT SERPL-MCNC: 6.5 G/DL — SIGNIFICANT CHANGE UP (ref 6–8)
RBC # BLD: 3.1 M/UL — LOW (ref 4.2–5.4)
RBC # FLD: 13.2 % — SIGNIFICANT CHANGE UP (ref 11.5–14.5)
SODIUM SERPL-SCNC: 140 MMOL/L — SIGNIFICANT CHANGE UP (ref 135–146)
WBC # BLD: 2.64 K/UL — LOW (ref 4.8–10.8)
WBC # FLD AUTO: 2.64 K/UL — LOW (ref 4.8–10.8)

## 2019-05-28 RX ADMIN — Medication 20 MILLIGRAM(S): at 05:45

## 2019-05-28 RX ADMIN — Medication 81 MILLIGRAM(S): at 11:03

## 2019-05-28 RX ADMIN — Medication 100 MILLIGRAM(S): at 05:45

## 2019-05-28 RX ADMIN — Medication 1 TABLET(S): at 13:14

## 2019-05-28 RX ADMIN — ATORVASTATIN CALCIUM 10 MILLIGRAM(S): 80 TABLET, FILM COATED ORAL at 21:02

## 2019-05-28 RX ADMIN — LOSARTAN POTASSIUM 100 MILLIGRAM(S): 100 TABLET, FILM COATED ORAL at 05:46

## 2019-05-28 RX ADMIN — Medication 100 MILLIGRAM(S): at 17:01

## 2019-05-28 RX ADMIN — Medication 200 MILLIGRAM(S): at 05:45

## 2019-05-28 RX ADMIN — ENOXAPARIN SODIUM 40 MILLIGRAM(S): 100 INJECTION SUBCUTANEOUS at 11:03

## 2019-05-28 RX ADMIN — Medication 300 MILLIGRAM(S): at 13:16

## 2019-05-28 RX ADMIN — Medication 200 MILLIGRAM(S): at 17:01

## 2019-05-28 RX ADMIN — Medication 5 MILLIGRAM(S): at 11:02

## 2019-05-28 RX ADMIN — Medication 50 MICROGRAM(S): at 05:45

## 2019-05-28 RX ADMIN — CHLORHEXIDINE GLUCONATE 1 APPLICATION(S): 213 SOLUTION TOPICAL at 05:44

## 2019-05-28 RX ADMIN — AMIODARONE HYDROCHLORIDE 200 MILLIGRAM(S): 400 TABLET ORAL at 05:45

## 2019-05-28 RX ADMIN — Medication 1 APPLICATION(S): at 13:15

## 2019-05-28 RX ADMIN — Medication 300 MILLIGRAM(S): at 21:02

## 2019-05-28 NOTE — DIETITIAN INITIAL EVALUATION ADULT. - PHYSICAL APPEARANCE
underweight/Pt was covered w/ blankets sleeping upon visit. Unable to assess fat/muscle wasting. BMI 18.1  IBW: 95#  BS 16, R LE venous stasis ulcer

## 2019-05-28 NOTE — PROGRESS NOTE ADULT - SUBJECTIVE AND OBJECTIVE BOX
Pt seen and examined  feels ok  remains confused and disoriented,  from her dementia.  on IV Abx        MEDICATIONS  (STANDING):  amiodarone    Tablet 200 milliGRAM(s) Oral daily  aspirin  chewable 81 milliGRAM(s) Oral daily  atorvastatin 10 milliGRAM(s) Oral at bedtime  cefpodoxime 200 milliGRAM(s) Oral every 12 hours  chlorhexidine 4% Liquid 1 Application(s) Topical <User Schedule>  clindamycin   Capsule 300 milliGRAM(s) Oral three times a day  collagenase Ointment 1 Application(s) Topical daily  docusate sodium 100 milliGRAM(s) Oral two times a day  enoxaparin Injectable 40 milliGRAM(s) SubCutaneous daily  furosemide    Tablet 20 milliGRAM(s) Oral daily  labetalol 100 milliGRAM(s) Oral two times a day  lactobacillus acidophilus 1 Tablet(s) Oral daily  levothyroxine 50 MICROGram(s) Oral daily  losartan 100 milliGRAM(s) Oral daily  oxybutynin 5 milliGRAM(s) Oral daily    MEDICATIONS  (PRN):  acetaminophen   Tablet .. 650 milliGRAM(s) Oral every 6 hours PRN Mild Pain (1 - 3)  aluminum hydroxide/magnesium hydroxide/simethicone Suspension 30 milliLiter(s) Oral every 6 hours PRN Dyspepsia  clonazePAM  Tablet 0.5 milliGRAM(s) Oral daily PRN anxiety/agitation      Vital Signs Last 24 Hrs  T(C): 36.3 (28 May 2019 05:20), Max: 37.1 (27 May 2019 11:53)  T(F): 97.3 (28 May 2019 05:20), Max: 98.7 (27 May 2019 11:53)  HR: 70 (28 May 2019 06:43) (70 - 70)  BP: 117/56 (28 May 2019 06:43) (117/56 - 177/77)  BP(mean): --  RR: 18 (27 May 2019 21:30) (18 - 18)  SpO2: 94% (27 May 2019 20:02) (94% - 94%)    PHYSICAL EXAM:    Constitutional: NAD, well-groomed, well-developed  HEENT: PERRLA, EOMI, Normal Hearing, MMM  Neck: No LAD, No JVD  Back: Normal spine flexure, No CVA tenderness  Respiratory: CTAB/L  Cardiovascular: S1 and S2, RRR, no M/G/R  Gastrointestinal: BS+, soft, NT/ND  Extremities: dressing RLE   Vascular: 2+ peripheral pulses  Neurological: A/O x 3, no focal deficits    LABS:                        10.5   2.64  )-----------( 166      ( 28 May 2019 09:15 )             31.7     05-28    140  |  102  |  12  ----------------------------<  98  4.1   |  28  |  1.0    Ca    9.3      28 May 2019 09:15  Phos  3.7     05-28  Mg     2.0     05-28    TPro  6.5  /  Alb  3.7  /  TBili  0.5  /  DBili  x   /  AST  16  /  ALT  7   /  AlkPhos  55  05-28        Drug Screen Urine:  Alcohol Level n/a  RADIOLOGY & ADDITIONAL STUDIES: n/a

## 2019-05-28 NOTE — PROGRESS NOTE ADULT - ASSESSMENT
Chronic RLE venous stasis ulcer with possible Cellulitis  -Leukopenic, Afebrile, Vitals WNL on admission  -RLE covered in dressing: did not Access  will check back before next dressing change   -ID recs Noted      *c/w IV Clindamycin and Vantin: complete 7 days course      * Add Acidophilus: added  - Per Vascular: no intervention O/P F/Up  - Tylenol prn for pain control    History of AFIB  - High CHADVASC- 5  -Not on AC, likely secondary to High fall risk  from Advanced Dementia    - c/w BB, amiodarone    Essential HTN: Uncontrolled   -c/w losartan and labetalol    Hypothyroidism appears stable  -c/w synthroid    Urinary Incontinence   -c/w Oxybutynin     CKD3:  stable    DLD  -On Lipitor 10mg  -will d/w  attending about discontinuing       *Risks vs Benefit     HFrEF: euvolemic on exam  - last Echo EF 35%: 5/6/18  - c/w Lasix,  BB, Losartan     PUD  c/w Maalox prn for dyspepsia     AAA--> CT Angio Abdomen and Pelvis w/ IV Cont (01.28.19 @ 13:48)  1. Marked aneurysmal dilation of ascending thoracic aorta, 7.4 x 7 cm.   Aortic arch dilated up to 4.2 cm, descending thoracic aorta up to 3.5 cm.   - based on CT surgery eval on last admission--> c/w conservative management, not a surgical candidate    DVT PPx--> Lovenox SQ  GI PPX: Not indicated     Dispo:  from [facility],     Code status- DNR- based on [facility] papers,   -HCP paper also in chart Chronic RLE venous stasis ulcer with possible Cellulitis  - Leukopenic, Afebrile, Vitals WNL on admission  - currently on IV Clindamycin and Vantin: complete 7 days course  - Per Vascular: no intervention O/P F/Up - dress with santyl daily  - Tylenol prn for pain control    History of AFIB  - High CHADVASC- 5  - Not on AC, likely secondary to High fall risk  from Advanced Dementia    - c/w BB, amiodarone    Essential HTN: Uncontrolled   - c/w losartan and labetalol  - this am SPB is 177     Hypothyroidism appears stable  -c/w synthroid    Urinary Incontinence   -c/w Oxybutynin     CKD3:  stable    DLD  -On Lipitor 10mg    HFrEF: euvolemic on exam  - last Echo EF 35%: 5/6/18  - c/w Lasix,  BB, Losartan    PUD  c/w Maalox prn for dyspepsia    AAA  - based on CT surgery eval on last admission--> c/w conservative management, not a surgical candidate    DVT PPx--> Lovenox SQ  GI PPX: Not indicated     Dispo:  from [facility],     Code status- DNR- based on SNF papers, HCP paper also in chart

## 2019-05-28 NOTE — DIETITIAN INITIAL EVALUATION ADULT. - OTHER INFO
Reason for assessment: BMI <19, Pt adm for sent in by Dr Urban for IV abx for RLE venous stasis ulcer. Reason for assessment: BMI <19, Pt adm for sent in by Dr Urban for IV abx for RLE venous stasis ulcer. Chronic RLE venous stasis ulcer with possible Cellulitis. Essential HTN: Uncontrolled. Urinary Incontinence. Hospice consulted. Pending d/c back to SNF tmrw.

## 2019-05-28 NOTE — DIETITIAN INITIAL EVALUATION ADULT. - SOURCE
called patient's daughter Sherita. She reports pt was eating okay PTA. Pt had dentures but are now missing. Pt drank ensure at nursing home. Pt's daughter has tried to give her foods from [employer] and cracker/cheese but pt wont eat much. Daughter can't visit often because of her cellulitis. Pt is on 1:1 feeds - documented po intake 0-20%. Will arrange for ensure supplements TID on meal tray. NKFA. Last BM 5/26 fecal incontinence./family/significant other

## 2019-05-28 NOTE — PROGRESS NOTE ADULT - SUBJECTIVE AND OBJECTIVE BOX
SUBJECTIVE:    Patient is a 89y old Female who presents with a chief complaint of sent in by Dr Urban for IV abx for RLE venous stasis ulcer (27 May 2019 17:08)    This morning patient has no specific complaints. Reports she is feeling better. Upon questioning still has some pain in RLE.     PAST MEDICAL & SURGICAL HISTORY  Chronic HFrEF (heart failure with reduced ejection fraction): EF 35%  Dementia  Urinary incontinence  PUD (peptic ulcer disease)  Thoracic aortic aneurysm without rupture  CKD (chronic kidney disease)  HLD (hyperlipidemia)  Afib  Osteoarthritis  Hypothyroid  HTN (hypertension)  H/O exploratory laparotomy: 1960s  History of thoracic aortic aneurysm repair: 2008       ALLERGIES:  morphine (Unknown)  oxycodone (Unknown)    MEDICATIONS:  STANDING MEDICATIONS  amiodarone    Tablet 200 milliGRAM(s) Oral daily  aspirin  chewable 81 milliGRAM(s) Oral daily  atorvastatin 10 milliGRAM(s) Oral at bedtime  cefpodoxime 200 milliGRAM(s) Oral every 12 hours  chlorhexidine 4% Liquid 1 Application(s) Topical <User Schedule>  clindamycin IVPB      clindamycin IVPB 300 milliGRAM(s) IV Intermittent every 8 hours  collagenase Ointment 1 Application(s) Topical daily  docusate sodium 100 milliGRAM(s) Oral two times a day  enoxaparin Injectable 40 milliGRAM(s) SubCutaneous daily  furosemide    Tablet 20 milliGRAM(s) Oral daily  labetalol 100 milliGRAM(s) Oral two times a day  lactobacillus acidophilus 1 Tablet(s) Oral daily  levothyroxine 50 MICROGram(s) Oral daily  losartan 100 milliGRAM(s) Oral daily  oxybutynin 5 milliGRAM(s) Oral daily    PRN MEDICATIONS  acetaminophen   Tablet .. 650 milliGRAM(s) Oral every 6 hours PRN  aluminum hydroxide/magnesium hydroxide/simethicone Suspension 30 milliLiter(s) Oral every 6 hours PRN  clonazePAM  Tablet 0.5 milliGRAM(s) Oral daily PRN    VITALS:   T(F): 97.3  HR: 70  BP: 117/56  RR: 18  SpO2: 94%    LABS:                                PHYSICAL EXAM:  GEN: NAD, comfortable  LUNGS: CTAB, no w/r/r  HEART: RRR, no m/r/g  ABD: soft, NT/ND, +BS  EXT: RLE ulcer with black necrotic areas,   NEURO: Patient knows name but not aware of place or time. Moves all extremities.

## 2019-05-28 NOTE — DIETITIAN INITIAL EVALUATION ADULT. - ENERGY NEEDS
estimated calorie needs: 829 - 1037 kcals/day (MSJ x 1.2 - 1.5 AF for severe PCM)  estimated protein needs: 47 - 59 gms/day (1.0-1.2 gm/kg for same as above)  estimated fluid needs: 1ml/kcal or per LIP

## 2019-05-28 NOTE — CHART NOTE - NSCHARTNOTEFT_GEN_A_CORE
Upon Nutritional Assessment by the Registered Dietitian your patient was determined to meet criteria / has evidence of the following diagnosis/diagnoses:          [ ]  Mild Protein Calorie Malnutrition        [ ]  Moderate Protein Calorie Malnutrition        [x] Severe Protein Calorie Malnutrition        [ ] Unspecified Protein Calorie Malnutrition        [x] Underweight / BMI <19        [ ] Morbid Obesity / BMI > 40      Findings as based on:  •  Comprehensive nutrition assessment and consultation  •  Food acceptance and intake status from observations by staff    · Nutrient: Malnutrition; severe  · Etiology: decreased appetite 2/2 advanced age vs difficulty feeding self  · Signs/Symptoms: po intake <75% of estimated energy requirements for >1 month and 12% wt loss in over 1 month      Treatment:    The following diet has been recommended: Order ensure enlive TID.       PROVIDER Section:     By signing this assessment you are acknowledging and agree with the diagnosis/diagnoses assigned by the Registered Dietician    Comments:

## 2019-05-28 NOTE — PROGRESS NOTE ADULT - ASSESSMENT
Stasis ulcer RLE  celullitis  resolving  dementia          Hospice consulted  spoke to daughter   Intent to d/c back to  [facility] tomorrow on PO Abx vantin and clinda for another week   santyl dressings to continue

## 2019-05-29 ENCOUNTER — TRANSCRIPTION ENCOUNTER (OUTPATIENT)
Age: 84
End: 2019-05-29

## 2019-05-29 RX ORDER — CEFPODOXIME PROXETIL 100 MG
1 TABLET ORAL
Qty: 14 | Refills: 0
Start: 2019-05-29 | End: 2019-06-04

## 2019-05-29 RX ADMIN — Medication 20 MILLIGRAM(S): at 05:20

## 2019-05-29 RX ADMIN — Medication 200 MILLIGRAM(S): at 17:24

## 2019-05-29 RX ADMIN — Medication 300 MILLIGRAM(S): at 14:17

## 2019-05-29 RX ADMIN — CHLORHEXIDINE GLUCONATE 1 APPLICATION(S): 213 SOLUTION TOPICAL at 05:22

## 2019-05-29 RX ADMIN — LOSARTAN POTASSIUM 100 MILLIGRAM(S): 100 TABLET, FILM COATED ORAL at 05:20

## 2019-05-29 RX ADMIN — Medication 1 APPLICATION(S): at 11:46

## 2019-05-29 RX ADMIN — ATORVASTATIN CALCIUM 10 MILLIGRAM(S): 80 TABLET, FILM COATED ORAL at 21:17

## 2019-05-29 RX ADMIN — Medication 5 MILLIGRAM(S): at 11:47

## 2019-05-29 RX ADMIN — Medication 1 TABLET(S): at 11:47

## 2019-05-29 RX ADMIN — Medication 100 MILLIGRAM(S): at 17:25

## 2019-05-29 RX ADMIN — Medication 300 MILLIGRAM(S): at 21:17

## 2019-05-29 RX ADMIN — Medication 100 MILLIGRAM(S): at 05:20

## 2019-05-29 RX ADMIN — Medication 50 MICROGRAM(S): at 05:20

## 2019-05-29 RX ADMIN — Medication 300 MILLIGRAM(S): at 05:21

## 2019-05-29 RX ADMIN — AMIODARONE HYDROCHLORIDE 200 MILLIGRAM(S): 400 TABLET ORAL at 05:21

## 2019-05-29 RX ADMIN — ENOXAPARIN SODIUM 40 MILLIGRAM(S): 100 INJECTION SUBCUTANEOUS at 14:17

## 2019-05-29 RX ADMIN — Medication 200 MILLIGRAM(S): at 05:20

## 2019-05-29 RX ADMIN — Medication 81 MILLIGRAM(S): at 11:47

## 2019-05-29 NOTE — PROGRESS NOTE ADULT - SUBJECTIVE AND OBJECTIVE BOX
SUBJECTIVE:    Patient is a 89y old Female who presents with a chief complaint of sent in by Dr Urban for IV abx for RLE venous stasis ulcer (29 May 2019 13:20)    Stable, no acute events.    PAST MEDICAL & SURGICAL HISTORY  Chronic HFrEF (heart failure with reduced ejection fraction): EF 35%  Dementia  Urinary incontinence  PUD (peptic ulcer disease)  Thoracic aortic aneurysm without rupture  CKD (chronic kidney disease)  HLD (hyperlipidemia)  Afib  Osteoarthritis  Hypothyroid  HTN (hypertension)  H/O exploratory laparotomy: 1960s  History of thoracic aortic aneurysm repair: 2008       ALLERGIES:  morphine (Unknown)  oxycodone (Unknown)    MEDICATIONS:  STANDING MEDICATIONS  amiodarone    Tablet 200 milliGRAM(s) Oral daily  aspirin  chewable 81 milliGRAM(s) Oral daily  atorvastatin 10 milliGRAM(s) Oral at bedtime  cefpodoxime 200 milliGRAM(s) Oral every 12 hours  chlorhexidine 4% Liquid 1 Application(s) Topical <User Schedule>  clindamycin   Capsule 300 milliGRAM(s) Oral three times a day  collagenase Ointment 1 Application(s) Topical daily  docusate sodium 100 milliGRAM(s) Oral two times a day  enoxaparin Injectable 40 milliGRAM(s) SubCutaneous daily  furosemide    Tablet 20 milliGRAM(s) Oral daily  labetalol 100 milliGRAM(s) Oral two times a day  lactobacillus acidophilus 1 Tablet(s) Oral daily  levothyroxine 50 MICROGram(s) Oral daily  losartan 100 milliGRAM(s) Oral daily  oxybutynin 5 milliGRAM(s) Oral daily    PRN MEDICATIONS  acetaminophen   Tablet .. 650 milliGRAM(s) Oral every 6 hours PRN  aluminum hydroxide/magnesium hydroxide/simethicone Suspension 30 milliLiter(s) Oral every 6 hours PRN  clonazePAM  Tablet 0.5 milliGRAM(s) Oral daily PRN    VITALS:   T(F): 97.9  HR: 70  BP: 118/68  RR: 18  SpO2: --    LABS:                        10.5   2.64  )-----------( 166      ( 28 May 2019 09:15 )             31.7     05-28    140  |  102  |  12  ----------------------------<  98  4.1   |  28  |  1.0    Ca    9.3      28 May 2019 09:15  Phos  3.7     05-28  Mg     2.0     05-28    TPro  6.5  /  Alb  3.7  /  TBili  0.5  /  DBili  x   /  AST  16  /  ALT  7   /  AlkPhos  55  05-28                          PHYSICAL EXAM:  GEN: NAD, comfortable  LUNGS: CTAB, no w/r/r  HEART: RRR, no m/r/g  ABD: soft, NT/ND, +BS  EXT: no edema, PP b/l  NEURO: AAOx3 SUBJECTIVE:    Patient is a 89y old Female who presents with a chief complaint of sent in by Dr Urban for IV abx for RLE venous stasis ulcer (29 May 2019 13:20)    Stable, no acute events.    PAST MEDICAL & SURGICAL HISTORY  Chronic HFrEF (heart failure with reduced ejection fraction): EF 35%  Dementia  Urinary incontinence  PUD (peptic ulcer disease)  Thoracic aortic aneurysm without rupture  CKD (chronic kidney disease)  HLD (hyperlipidemia)  Afib  Osteoarthritis  Hypothyroid  HTN (hypertension)  H/O exploratory laparotomy: 1960s  History of thoracic aortic aneurysm repair: 2008       ALLERGIES:  morphine (Unknown)  oxycodone (Unknown)    MEDICATIONS:  STANDING MEDICATIONS  amiodarone    Tablet 200 milliGRAM(s) Oral daily  aspirin  chewable 81 milliGRAM(s) Oral daily  atorvastatin 10 milliGRAM(s) Oral at bedtime  cefpodoxime 200 milliGRAM(s) Oral every 12 hours  chlorhexidine 4% Liquid 1 Application(s) Topical <User Schedule>  clindamycin   Capsule 300 milliGRAM(s) Oral three times a day  collagenase Ointment 1 Application(s) Topical daily  docusate sodium 100 milliGRAM(s) Oral two times a day  enoxaparin Injectable 40 milliGRAM(s) SubCutaneous daily  furosemide    Tablet 20 milliGRAM(s) Oral daily  labetalol 100 milliGRAM(s) Oral two times a day  lactobacillus acidophilus 1 Tablet(s) Oral daily  levothyroxine 50 MICROGram(s) Oral daily  losartan 100 milliGRAM(s) Oral daily  oxybutynin 5 milliGRAM(s) Oral daily    PRN MEDICATIONS  acetaminophen   Tablet .. 650 milliGRAM(s) Oral every 6 hours PRN  aluminum hydroxide/magnesium hydroxide/simethicone Suspension 30 milliLiter(s) Oral every 6 hours PRN  clonazePAM  Tablet 0.5 milliGRAM(s) Oral daily PRN    VITALS:   T(F): 97.9  HR: 70  BP: 118/68  RR: 18  SpO2: --    LABS:                        10.5   2.64  )-----------( 166      ( 28 May 2019 09:15 )             31.7     05-28    140  |  102  |  12  ----------------------------<  98  4.1   |  28  |  1.0    Ca    9.3      28 May 2019 09:15  Phos  3.7     05-28  Mg     2.0     05-28    TPro  6.5  /  Alb  3.7  /  TBili  0.5  /  DBili  x   /  AST  16  /  ALT  7   /  AlkPhos  55  05-28                          PHYSICAL EXAM:  GEN: NAD, comfortable  LUNGS: CTAB, no w/r/r  HEART: RRR, no m/r/g  ABD: soft, NT/ND, +BS  EXT: no edema  NEURO: AAOx3

## 2019-05-29 NOTE — PROGRESS NOTE ADULT - SUBJECTIVE AND OBJECTIVE BOX
Pt seen, preceding noted  No new issues  LE wound much better      SOCIAL HISTORY: n/a      MEDICATIONS  (STANDING):  amiodarone    Tablet 200 milliGRAM(s) Oral daily  aspirin  chewable 81 milliGRAM(s) Oral daily  atorvastatin 10 milliGRAM(s) Oral at bedtime  cefpodoxime 200 milliGRAM(s) Oral every 12 hours  chlorhexidine 4% Liquid 1 Application(s) Topical <User Schedule>  clindamycin   Capsule 300 milliGRAM(s) Oral three times a day  collagenase Ointment 1 Application(s) Topical daily  docusate sodium 100 milliGRAM(s) Oral two times a day  enoxaparin Injectable 40 milliGRAM(s) SubCutaneous daily  furosemide    Tablet 20 milliGRAM(s) Oral daily  labetalol 100 milliGRAM(s) Oral two times a day  lactobacillus acidophilus 1 Tablet(s) Oral daily  levothyroxine 50 MICROGram(s) Oral daily  losartan 100 milliGRAM(s) Oral daily  oxybutynin 5 milliGRAM(s) Oral daily    MEDICATIONS  (PRN):  acetaminophen   Tablet .. 650 milliGRAM(s) Oral every 6 hours PRN Mild Pain (1 - 3)  aluminum hydroxide/magnesium hydroxide/simethicone Suspension 30 milliLiter(s) Oral every 6 hours PRN Dyspepsia  clonazePAM  Tablet 0.5 milliGRAM(s) Oral daily PRN anxiety/agitation      Vital Signs Last 24 Hrs  T(C): 37 (29 May 2019 04:46), Max: 37.4 (28 May 2019 21:10)  T(F): 98.6 (29 May 2019 04:46), Max: 99.4 (28 May 2019 21:10)  HR: 70 (29 May 2019 04:46) (70 - 75)  BP: 145/68 (29 May 2019 04:46) (127/70 - 164/73)  BP(mean): --  RR: 18 (29 May 2019 04:46) (18 - 18)  SpO2: --    PHYSICAL EXAM:    Constitutional: NAD, well-groomed, well-developed  HEENT: PERRLA, EOMI, Normal Hearing, MMM  Neck: No LAD, No JVD  Back: Normal spine flexure, No CVA tenderness  Respiratory: CTAB/L  Cardiovascular: S1 and S2, RRR, no M/G/R  Gastrointestinal: BS+, soft, NT/ND  Extremities: Venous stasis   Vascular: 2+ peripheral pulses  Neurological:  no focal deficits  demented    LABS:                        10.5   2.64  )-----------( 166      ( 28 May 2019 09:15 )             31.7     05-28    140  |  102  |  12  ----------------------------<  98  4.1   |  28  |  1.0    Ca    9.3      28 May 2019 09:15  Phos  3.7     05-28  Mg     2.0     05-28    TPro  6.5  /  Alb  3.7  /  TBili  0.5  /  DBili  x   /  AST  16  /  ALT  7   /  AlkPhos  55  05-28        Drug Screen Urine:  Alcohol Level  n/a        RADIOLOGY & ADDITIONAL STUDIES:  n/a

## 2019-05-29 NOTE — PROGRESS NOTE ADULT - ASSESSMENT
Chronic RLE venous stasis ulcer with possible Cellulitis  - Leukopenic, Afebrile, Vitals WNL on admission  - currently on IV Clindamycin and Vantin: complete 7 days course  - Per Vascular: no intervention O/P F/Up - dress with santyl daily  - Tylenol prn for pain control    History of AFIB  - High CHADVASC- 5  - Not on AC, likely secondary to High fall risk  from Advanced Dementia    - c/w BB, amiodarone    Essential HTN: Uncontrolled   - c/w losartan and labetalol  - this am SPB is 177     Hypothyroidism appears stable  -c/w synthroid    Urinary Incontinence   -c/w Oxybutynin     CKD3:  stable    DLD  -On Lipitor 10mg    HFrEF: euvolemic on exam  - last Echo EF 35%: 5/6/18  - c/w Lasix,  BB, Losartan    PUD  c/w Maalox prn for dyspepsia    AAA  - based on CT surgery eval on last admission--> c/w conservative management, not a surgical candidate    DVT PPx--> Lovenox SQ  GI PPX: Not indicated     Dispo:  from [facility],     Code status- DNR- based on SNF papers, HCP paper also in chart Chronic RLE venous stasis ulcer with possible Cellulitis  - Leukopenic, Afebrile, Vitals WNL on admission  - currently on IV Clindamycin and Vantin: discharge to complete 7 day courrse  - Per Vascular: no intervention O/P F/Up - dress with santyl daily  - Tylenol prn for pain control    History of AFIB  - High CHADVASC- 5  - Not on AC, likely secondary to High fall risk  from Advanced Dementia    - c/w BB, amiodarone    Essential HTN: Uncontrolled   - c/w losartan and labetalol  - blood pressure has been well controlled over last 24 hours    Hypothyroidism appears stable  -c/w synthroid    Urinary Incontinence   -c/w Oxybutynin     CKD3:  stable    DLD  -On Lipitor 10mg    HFrEF: euvolemic on exam  - last Echo EF 35%: 5/6/18  - c/w Lasix,  BB, Losartan    PUD  - c/w Maalox prn for dyspepsia    AAA  - based on CT surgery eval on last admission--> c/w conservative management, not a surgical candidate    DVT PPx--> Lovenox SQ  GI PPX: Not indicated     Dispo:  from [facility],     Code status- DNR/DNI - based on SNF papers, HCP paper also in chart.  Patient to be discharge tomorrow to [facility] where she will be followed by hospice team. She will start on hospice service on Friday.

## 2019-05-29 NOTE — DISCHARGE NOTE PROVIDER - NSDCFUADDINST_GEN_ALL_CORE_FT
Please follow up within 1 week of discharge with your family doctor. You will also be followed by the hospice team after discharge from the hospital.

## 2019-05-29 NOTE — PROGRESS NOTE ADULT - ASSESSMENT
Venous stasis ulcer  celullitis RLE  resolving  Advanced dementia        D/C to the [facility] today  discharge meds discussed with house staff  Hospice care once in the [facility] f/u with pt there on Friday

## 2019-05-29 NOTE — DISCHARGE NOTE PROVIDER - HOSPITAL COURSE
89 yo F w/ multiple comorbidities including advanced dementia, sent in from [facility] by Dr Urban for IV abx treatment for infected RLE venous stasis ulcer. She received iv unasyn, cefepime and vancomycin. She is now transitioned to PO vantin and clindamycin with some improvement of the ulcer. She will continue with the PO antibiotics after discharge to complete a 7 day course. While in patient her family showed interest in hospice care for the patient. Hospice was consulted and they will follow the patient after discharge back to [place].

## 2019-05-29 NOTE — DISCHARGE NOTE PROVIDER - NSDCCPCAREPLAN_GEN_ALL_CORE_FT
PRINCIPAL DISCHARGE DIAGNOSIS  Diagnosis: Stasis ulcer  Assessment and Plan of Treatment: You were treated for this infection with a 7 day course of antibiotics. Please continue to take the antibiotics as prescribed. Please follow up with your primary care doctor within 1 week of discharge.

## 2019-05-30 ENCOUNTER — TRANSCRIPTION ENCOUNTER (OUTPATIENT)
Age: 84
End: 2019-05-30

## 2019-05-30 VITALS
RESPIRATION RATE: 18 BRPM | TEMPERATURE: 97 F | DIASTOLIC BLOOD PRESSURE: 67 MMHG | SYSTOLIC BLOOD PRESSURE: 174 MMHG | HEART RATE: 82 BPM

## 2019-05-30 LAB
CULTURE RESULTS: SIGNIFICANT CHANGE UP
CULTURE RESULTS: SIGNIFICANT CHANGE UP
SPECIMEN SOURCE: SIGNIFICANT CHANGE UP
SPECIMEN SOURCE: SIGNIFICANT CHANGE UP

## 2019-05-30 RX ADMIN — Medication 1 TABLET(S): at 12:22

## 2019-05-30 RX ADMIN — Medication 100 MILLIGRAM(S): at 05:24

## 2019-05-30 RX ADMIN — AMIODARONE HYDROCHLORIDE 200 MILLIGRAM(S): 400 TABLET ORAL at 05:24

## 2019-05-30 RX ADMIN — Medication 50 MICROGRAM(S): at 05:24

## 2019-05-30 RX ADMIN — CHLORHEXIDINE GLUCONATE 1 APPLICATION(S): 213 SOLUTION TOPICAL at 05:33

## 2019-05-30 RX ADMIN — Medication 300 MILLIGRAM(S): at 13:41

## 2019-05-30 RX ADMIN — Medication 5 MILLIGRAM(S): at 12:22

## 2019-05-30 RX ADMIN — Medication 200 MILLIGRAM(S): at 05:23

## 2019-05-30 RX ADMIN — Medication 81 MILLIGRAM(S): at 12:22

## 2019-05-30 RX ADMIN — Medication 1 APPLICATION(S): at 12:22

## 2019-05-30 RX ADMIN — Medication 300 MILLIGRAM(S): at 05:23

## 2019-05-30 RX ADMIN — LOSARTAN POTASSIUM 100 MILLIGRAM(S): 100 TABLET, FILM COATED ORAL at 05:24

## 2019-05-30 RX ADMIN — Medication 100 MILLIGRAM(S): at 05:23

## 2019-05-30 RX ADMIN — ENOXAPARIN SODIUM 40 MILLIGRAM(S): 100 INJECTION SUBCUTANEOUS at 12:22

## 2019-05-30 RX ADMIN — Medication 20 MILLIGRAM(S): at 05:24

## 2019-05-30 NOTE — PROGRESS NOTE ADULT - SUBJECTIVE AND OBJECTIVE BOX
Preceding events noted  Pt back to her baseline  cellulitis resolved  Stasis ulcer on treatment  Hospice eval and disposition noted  will go under hospice when she goes to [facility]      SOCIAL HISTORY: n/a      MEDICATIONS  (STANDING):  amiodarone    Tablet 200 milliGRAM(s) Oral daily  aspirin  chewable 81 milliGRAM(s) Oral daily  atorvastatin 10 milliGRAM(s) Oral at bedtime  cefpodoxime 200 milliGRAM(s) Oral every 12 hours  chlorhexidine 4% Liquid 1 Application(s) Topical <User Schedule>  clindamycin   Capsule 300 milliGRAM(s) Oral three times a day  collagenase Ointment 1 Application(s) Topical daily  docusate sodium 100 milliGRAM(s) Oral two times a day  enoxaparin Injectable 40 milliGRAM(s) SubCutaneous daily  furosemide    Tablet 20 milliGRAM(s) Oral daily  labetalol 100 milliGRAM(s) Oral two times a day  lactobacillus acidophilus 1 Tablet(s) Oral daily  levothyroxine 50 MICROGram(s) Oral daily  losartan 100 milliGRAM(s) Oral daily  oxybutynin 5 milliGRAM(s) Oral daily    MEDICATIONS  (PRN):  acetaminophen   Tablet .. 650 milliGRAM(s) Oral every 6 hours PRN Mild Pain (1 - 3)  aluminum hydroxide/magnesium hydroxide/simethicone Suspension 30 milliLiter(s) Oral every 6 hours PRN Dyspepsia  clonazePAM  Tablet 0.5 milliGRAM(s) Oral daily PRN anxiety/agitation      Vital Signs Last 24 Hrs  T(C): 35.6 (30 May 2019 05:40), Max: 37 (29 May 2019 22:36)  T(F): 96.1 (30 May 2019 05:40), Max: 98.6 (29 May 2019 22:36)  HR: 70 (30 May 2019 05:40) (70 - 70)  BP: 146/67 (30 May 2019 05:40) (118/68 - 146/67)  BP(mean): --  RR: 18 (30 May 2019 05:40) (18 - 18)  SpO2: --    PHYSICAL EXAM:    Constitutional: NAD, well-groomed, well-developed  HEENT: PERRLA, EOMI, Normal Hearing, MMM  Neck: No LAD, No JVD  Back: Normal spine flexure, No CVA tenderness  Respiratory: CTAB/L  Cardiovascular: S1 and S2, RRR, no M/G/R  Gastrointestinal: BS+, soft, NT/ND  Extremities: venous stasis dermatitis  Vascular: 2+ peripheral pulses  Neurological:  demented , no focal deficits    LABS:      n/a        Drug Screen Urine:  Alcohol Level  n/a      RADIOLOGY & ADDITIONAL STUDIES:   n/a

## 2019-05-30 NOTE — PROGRESS NOTE ADULT - REASON FOR ADMISSION
sent in by Dr Urban for IV abx for RLE venous stasis ulcer

## 2019-05-30 NOTE — PROGRESS NOTE ADULT - ASSESSMENT
Celullitis resolved  Venous stasis ulcer RLE  dementia advanced          D/C to [facility]  Hospice at [place]

## 2019-05-30 NOTE — DISCHARGE NOTE NURSING/CASE MANAGEMENT/SOCIAL WORK - NSDCDPATPORTLINK_GEN_ALL_CORE
You can access the [facility] Patient Portal, offered by [facility], by registering with the following website: [email or web address]

## 2019-06-03 PROBLEM — F03.90 UNSPECIFIED DEMENTIA WITHOUT BEHAVIORAL DISTURBANCE: Chronic | Status: ACTIVE | Noted: 2019-05-24

## 2019-06-03 PROBLEM — I71.2 THORACIC AORTIC ANEURYSM, WITHOUT RUPTURE: Chronic | Status: ACTIVE | Noted: 2018-05-05

## 2019-06-03 PROBLEM — I50.22 CHRONIC SYSTOLIC (CONGESTIVE) HEART FAILURE: Chronic | Status: ACTIVE | Noted: 2019-05-24

## 2019-06-04 DIAGNOSIS — I83.028 VARICOSE VEINS OF LEFT LOWER EXTREMITY WITH ULCER OTHER PART OF LOWER LEG: ICD-10-CM

## 2019-06-04 DIAGNOSIS — M19.90 UNSPECIFIED OSTEOARTHRITIS, UNSPECIFIED SITE: ICD-10-CM

## 2019-06-04 DIAGNOSIS — I13.0 HYPERTENSIVE HEART AND CHRONIC KIDNEY DISEASE WITH HEART FAILURE AND STAGE 1 THROUGH STAGE 4 CHRONIC KIDNEY DISEASE, OR UNSPECIFIED CHRONIC KIDNEY DISEASE: ICD-10-CM

## 2019-06-04 DIAGNOSIS — Z88.5 ALLERGY STATUS TO NARCOTIC AGENT: ICD-10-CM

## 2019-06-04 DIAGNOSIS — I50.22 CHRONIC SYSTOLIC (CONGESTIVE) HEART FAILURE: ICD-10-CM

## 2019-06-04 DIAGNOSIS — I73.9 PERIPHERAL VASCULAR DISEASE, UNSPECIFIED: ICD-10-CM

## 2019-06-04 DIAGNOSIS — E43 UNSPECIFIED SEVERE PROTEIN-CALORIE MALNUTRITION: ICD-10-CM

## 2019-06-04 DIAGNOSIS — Z88.1 ALLERGY STATUS TO OTHER ANTIBIOTIC AGENTS STATUS: ICD-10-CM

## 2019-06-04 DIAGNOSIS — I48.91 UNSPECIFIED ATRIAL FIBRILLATION: ICD-10-CM

## 2019-06-04 DIAGNOSIS — Z79.899 OTHER LONG TERM (CURRENT) DRUG THERAPY: ICD-10-CM

## 2019-06-04 DIAGNOSIS — K27.9 PEPTIC ULCER, SITE UNSPECIFIED, UNSPECIFIED AS ACUTE OR CHRONIC, WITHOUT HEMORRHAGE OR PERFORATION: ICD-10-CM

## 2019-06-04 DIAGNOSIS — E78.5 HYPERLIPIDEMIA, UNSPECIFIED: ICD-10-CM

## 2019-06-04 DIAGNOSIS — I83.218 VARICOSE VEINS OF RIGHT LOWER EXTREMITY WITH BOTH ULCER OF OTHER PART OF LOWER EXTREMITY AND INFLAMMATION: ICD-10-CM

## 2019-06-04 DIAGNOSIS — L97.828 NON-PRESSURE CHRONIC ULCER OF OTHER PART OF LEFT LOWER LEG WITH OTHER SPECIFIED SEVERITY: ICD-10-CM

## 2019-06-04 DIAGNOSIS — L97.818 NON-PRESSURE CHRONIC ULCER OF OTHER PART OF RIGHT LOWER LEG WITH OTHER SPECIFIED SEVERITY: ICD-10-CM

## 2019-06-04 DIAGNOSIS — Z95.0 PRESENCE OF CARDIAC PACEMAKER: ICD-10-CM

## 2019-06-04 DIAGNOSIS — N18.3 CHRONIC KIDNEY DISEASE, STAGE 3 (MODERATE): ICD-10-CM

## 2019-06-04 DIAGNOSIS — Z79.82 LONG TERM (CURRENT) USE OF ASPIRIN: ICD-10-CM

## 2019-06-04 DIAGNOSIS — E03.9 HYPOTHYROIDISM, UNSPECIFIED: ICD-10-CM

## 2019-06-04 DIAGNOSIS — L03.115 CELLULITIS OF RIGHT LOWER LIMB: ICD-10-CM

## 2019-06-04 DIAGNOSIS — F03.90 UNSPECIFIED DEMENTIA WITHOUT BEHAVIORAL DISTURBANCE: ICD-10-CM

## 2019-06-12 ENCOUNTER — APPOINTMENT (OUTPATIENT)
Dept: CARDIOLOGY | Facility: CLINIC | Age: 84
End: 2019-06-12

## 2019-07-30 ENCOUNTER — EMERGENCY (EMERGENCY)
Facility: HOSPITAL | Age: 84
LOS: 0 days | End: 2019-07-30
Attending: EMERGENCY MEDICINE | Admitting: EMERGENCY MEDICINE
Payer: MEDICARE

## 2019-07-30 DIAGNOSIS — G40.909 EPILEPSY, UNSPECIFIED, NOT INTRACTABLE, WITHOUT STATUS EPILEPTICUS: ICD-10-CM

## 2019-07-30 DIAGNOSIS — Z79.899 OTHER LONG TERM (CURRENT) DRUG THERAPY: ICD-10-CM

## 2019-07-30 DIAGNOSIS — Z98.890 OTHER SPECIFIED POSTPROCEDURAL STATES: Chronic | ICD-10-CM

## 2019-07-30 DIAGNOSIS — Z88.5 ALLERGY STATUS TO NARCOTIC AGENT: ICD-10-CM

## 2019-07-30 DIAGNOSIS — Z79.82 LONG TERM (CURRENT) USE OF ASPIRIN: ICD-10-CM

## 2019-07-30 DIAGNOSIS — Z79.2 LONG TERM (CURRENT) USE OF ANTIBIOTICS: ICD-10-CM

## 2019-07-30 DIAGNOSIS — I49.9 CARDIAC ARRHYTHMIA, UNSPECIFIED: ICD-10-CM

## 2019-07-30 DIAGNOSIS — I46.8 CARDIAC ARREST DUE TO OTHER UNDERLYING CONDITION: ICD-10-CM

## 2019-07-30 PROCEDURE — 99284 EMERGENCY DEPT VISIT MOD MDM: CPT | Mod: GW

## 2019-07-30 NOTE — ED PROVIDER NOTE - OBJECTIVE STATEMENT
89 yo F DNR DNI brought to the ED from [facility]. Per EMS, patient had a seizure and  in the ambulance. They could not pronounce in the field and brought her to the ED. In the ED, there was cardiac standstill by bedside ultrasound. Pt pronounced at 0650am.

## 2019-07-30 NOTE — ED PROVIDER NOTE - PROGRESS NOTE DETAILS
Daughter Meghna Anika [phone] who live in [facility] aware that patient is in the ED. She will be here in 2 hours. Sherita Almita [phone], who lives in [place], aware that patient is in the ED and will be here within the hour. Daughter Sherita Recio at bedside.

## 2019-07-30 NOTE — ED PROVIDER NOTE - ATTENDING CONTRIBUTION TO CARE
I personally evaluated the patient. I reviewed the Resident’s or Physician Assistant’s note (as assigned above), and agree with the findings and plan except as documented in my note.    89 y/o F DNR/DNI presents via EMS DOA. Patient passed away in the ambulance. EMS found the patient w agonal breathing at the nursing home but patient  on the way to the hospital. TOD 6:50 am. Confirmed w bedside US. Dr. Finney spoke w the family and they are on their way.

## 2019-07-30 NOTE — ED ADULT NURSE REASSESSMENT NOTE - NS ED NURSE REASSESS COMMENT FT1
patient arrived via EMS, DOA.  Patient ultra sound by Dr. Díaz and found to be .  TIme called was 6:50am.

## 2020-07-23 NOTE — ED ADULT NURSE NOTE - NSFALLRSKASSESASSIST_ED_ALL_ED
Health Maintenance Due   Topic Date Due   • DTaP/Tdap/Td Vaccine (1 - Tdap) 07/26/1949   • Pneumococcal Vaccine 65+ (1 of 2 - PCV13) 07/26/2003   • Shingles Vaccine (2 of 3) 06/01/2010   • Medicare Wellness Visit  05/02/2020       Patient is due for topics as listed above but is not proceeding with  at this time. Declines st this time, will do At [facility].         yes

## 2021-03-30 NOTE — DISCHARGE NOTE ADULT - NS AS DC FU [facility] EJECTION FRACTION >40 NO

## 2022-01-14 NOTE — PATIENT PROFILE ADULT - NSPROEDAABILITYLEARN_GEN_A_NUR
No new care gaps identified.  Powered by [employer]. Reference number: [id number].   1/14/2022 10:40:18 AM CST   none/pt a&o x1. confused.

## 2023-06-27 NOTE — CONSULT NOTE ADULT - SUBJECTIVE AND OBJECTIVE BOX
VASCULAR SURGERY CONSULT NOTE      HPI:  89 yo F w/ multiple comorbidities including advanced dementia, sent in from [facility] by Dr Urban for IV abx treatment for infected RLE venous stasis ulcer. Patient unable to provide any history, mumbles irrelevant things when asked question. History obtained from chart and SNF paperwork. Duration of ulcer unknown. Patient denies any pain anywhere. In ED, VS stable. Patient got admitted under Dr Urban's service for further management. (24 May 2019 19:56)      Vascular surgery was consulted for possible debridement of necrotic tissue around the venous stasis ulcers.   PAST MEDICAL & SURGICAL HISTORY:  Chronic HFrEF (heart failure with reduced ejection fraction): EF 35%  Dementia  Urinary incontinence  PUD (peptic ulcer disease)  Thoracic aortic aneurysm without rupture  CKD (chronic kidney disease)  HLD (hyperlipidemia)  Afib  Osteoarthritis  Hypothyroid  HTN (hypertension)  H/O exploratory laparotomy: 1960s  History of thoracic aortic aneurysm repair: 2008    morphine (Unknown)  oxycodone (Unknown)    Home Medications:  Acidophilus oral tablet: 1 tab(s) orally once a day (24 May 2019 19:38)  Almacone-2 oral suspension: 5 milliliter(s) orally 3 times, As Needed (24 May 2019 19:38)  amiodarone 100 mg oral tablet: 1 tab(s) orally once a day (24 May 2019 19:38)  aspirin 81 mg oral tablet: 1 tab(s) orally once a day (24 May 2019 19:38)  clonazePAM 0.5 mg oral tablet: orally once a day (24 May 2019 19:38)  Colace 100 mg oral capsule: 1 cap(s) orally 2 times a day (24 May 2019 19:38)  Daily Kyle oral tablet: 1 tab(s) orally once a day (24 May 2019 19:38)  furosemide 20 mg oral tablet: 1 tab(s) orally once a day (24 May 2019 19:38)  labetalol 100 mg oral tablet: 1 tab(s) orally 2 times a day (24 May 2019 19:38)  levothyroxine 50 mcg (0.05 mg) oral tablet: 1 tab(s) orally once a day (24 May 2019 19:38)  Mapap 500 mg oral capsule: 1  orally , As Needed (24 May 2019 19:38)  Mi-Acid oral suspension: 10 milliliter(s) orally 3 times, As Needed (24 May 2019 19:38)  oxybutynin 5 mg/24 hours oral tablet, extended release: 1 tab(s) orally once a day (24 May 2019 19:38)  pravastatin 40 mg oral tablet: 1 tab(s) orally once a day (24 May 2019 19:38)  Silvadene 1% topical cream: Apply topically to affected area once a day; on R LL (24 May 2019 19:38)  valsartan 320 mg oral tablet: 1 tab(s) orally once a day (24 May 2019 19:38)  Vitamin C 500 mg oral tablet: 1 tab(s) orally once a day (24 May 2019 19:38)    PHYSICAL EXAM  Vital Signs Last 24 Hrs  T(C): 36.1 (25 May 2019 13:05), Max: 37.1 (24 May 2019 20:40)  T(F): 96.9 (25 May 2019 13:05), Max: 98.7 (24 May 2019 20:40)  HR: 72 (25 May 2019 13:05) (69 - 77)  BP: 135/64 (25 May 2019 13:05) (110/54 - 176/80)  BP(mean): --  RR: 18 (25 May 2019 13:05) (18 - 18)  SpO2: 96% (24 May 2019 15:23) (96% - 96%)    Appearance: Normal	  HEENT:   Normal oral mucosa, PERRL, EOMI	  Neck: Supple, - JVD; Carotid Bruit   Cardiovascular: Normal S1 S2, No JVD, No murmurs,   Respiratory: Lungs clear to auscultation, No Rales, Rhonchi, Wheezing	  Gastrointestinal:  Soft, Non-tender, positive BS	  Skin: No rashes, No ecchymoses, No cyanosis  Extremities: Normal range of motion, No clubbing, cyanosis or edema  Vascular: Peripheral pulses palpable 2+ bilaterally    MEDICATIONS:   MEDICATIONS  (STANDING):  amiodarone    Tablet 200 milliGRAM(s) Oral daily  ampicillin/sulbactam  IVPB      ampicillin/sulbactam  IVPB 3 Gram(s) IV Intermittent every 6 hours  aspirin  chewable 81 milliGRAM(s) Oral daily  atorvastatin 10 milliGRAM(s) Oral at bedtime  chlorhexidine 4% Liquid 1 Application(s) Topical <User Schedule>  clindamycin IVPB      clindamycin IVPB 300 milliGRAM(s) IV Intermittent every 8 hours  docusate sodium 100 milliGRAM(s) Oral two times a day  enoxaparin Injectable 40 milliGRAM(s) SubCutaneous daily  furosemide    Tablet 20 milliGRAM(s) Oral daily  labetalol 100 milliGRAM(s) Oral two times a day  levothyroxine 50 MICROGram(s) Oral daily  losartan 100 milliGRAM(s) Oral daily  oxybutynin 5 milliGRAM(s) Oral daily    MEDICATIONS  (PRN):  acetaminophen   Tablet .. 650 milliGRAM(s) Oral every 6 hours PRN Mild Pain (1 - 3)  aluminum hydroxide/magnesium hydroxide/simethicone Suspension 30 milliLiter(s) Oral every 6 hours PRN Dyspepsia  clonazePAM  Tablet 0.5 milliGRAM(s) Oral daily PRN anxiety/agitation      LAB/STUDIES:                        10.6   3.56  )-----------( 193      ( 25 May 2019 06:54 )             33.2     05-25    143  |  104  |  14  ----------------------------<  92  4.3   |  28  |  1.0    Ca    8.9      25 May 2019 06:54  Mg     2.4     05-24    TPro  6.8  /  Alb  3.7  /  TBili  0.4  /  DBili  x   /  AST  14  /  ALT  7   /  AlkPhos  71  05-24    PT/INR - ( 24 May 2019 15:20 )   PT: 12.70 sec;   INR: 1.11 ratio    PTT - ( 24 May 2019 15:20 )  PTT:32.2 sec  LIVER FUNCTIONS - ( 24 May 2019 15:01 )  Alb: 3.7 g/dL / Pro: 6.8 g/dL / ALK PHOS: 71 U/L / ALT: 7 U/L / AST: 14 U/L / GGT: x VASCULAR SURGERY CONSULT NOTE      HPI:  89 yo F w/ multiple comorbidities including advanced dementia, sent in from [facility] by Dr Urban for IV abx treatment for infected RLE venous stasis ulcer. Patient unable to provide any history, mumbles irrelevant things when asked question. History obtained from chart and SNF paperwork. Duration of ulcer unknown. Patient denies any pain anywhere. In ED, VS stable. Patient got admitted under Dr Urban's service for further management. (24 May 2019 19:56)  Vascular surgery was consulted for possible debridement of necrotic tissue around the venous stasis ulcers.     PAST MEDICAL & SURGICAL HISTORY:  Chronic HFrEF (heart failure with reduced ejection fraction): EF 35%  Dementia  Urinary incontinence  PUD (peptic ulcer disease)  Thoracic aortic aneurysm without rupture  CKD (chronic kidney disease)  HLD (hyperlipidemia)  Afib  Osteoarthritis  Hypothyroid  HTN (hypertension)  H/O exploratory laparotomy: 1960s  History of thoracic aortic aneurysm repair: 2008    morphine (Unknown)  oxycodone (Unknown)    Home Medications:  Acidophilus oral tablet: 1 tab(s) orally once a day (24 May 2019 19:38)  Almacone-2 oral suspension: 5 milliliter(s) orally 3 times, As Needed (24 May 2019 19:38)  amiodarone 100 mg oral tablet: 1 tab(s) orally once a day (24 May 2019 19:38)  aspirin 81 mg oral tablet: 1 tab(s) orally once a day (24 May 2019 19:38)  clonazePAM 0.5 mg oral tablet: orally once a day (24 May 2019 19:38)  Colace 100 mg oral capsule: 1 cap(s) orally 2 times a day (24 May 2019 19:38)  Daily Kyle oral tablet: 1 tab(s) orally once a day (24 May 2019 19:38)  furosemide 20 mg oral tablet: 1 tab(s) orally once a day (24 May 2019 19:38)  labetalol 100 mg oral tablet: 1 tab(s) orally 2 times a day (24 May 2019 19:38)  levothyroxine 50 mcg (0.05 mg) oral tablet: 1 tab(s) orally once a day (24 May 2019 19:38)  Mapap 500 mg oral capsule: 1  orally , As Needed (24 May 2019 19:38)  Mi-Acid oral suspension: 10 milliliter(s) orally 3 times, As Needed (24 May 2019 19:38)  oxybutynin 5 mg/24 hours oral tablet, extended release: 1 tab(s) orally once a day (24 May 2019 19:38)  pravastatin 40 mg oral tablet: 1 tab(s) orally once a day (24 May 2019 19:38)  Silvadene 1% topical cream: Apply topically to affected area once a day; on R LL (24 May 2019 19:38)  valsartan 320 mg oral tablet: 1 tab(s) orally once a day (24 May 2019 19:38)  Vitamin C 500 mg oral tablet: 1 tab(s) orally once a day (24 May 2019 19:38)    PHYSICAL EXAM  Vital Signs Last 24 Hrs  T(C): 36.1 (25 May 2019 13:05), Max: 37.1 (24 May 2019 20:40)  T(F): 96.9 (25 May 2019 13:05), Max: 98.7 (24 May 2019 20:40)  HR: 72 (25 May 2019 13:05) (69 - 77)  BP: 135/64 (25 May 2019 13:05) (110/54 - 176/80)  BP(mean): --  RR: 18 (25 May 2019 13:05) (18 - 18)  SpO2: 96% (24 May 2019 15:23) (96% - 96%)    Appearance: Normal	  HEENT:   Normal oral mucosa, PERRL, EOMI	  Neck: Supple, - JVD; Carotid Bruit   Cardiovascular: Normal S1 S2, No JVD, No murmurs,   Respiratory: Lungs clear to auscultation, No Rales, Rhonchi, Wheezing	  Gastrointestinal:  Soft, Non-tender, positive BS	  Extremities: Bilateral venous stasis RLE>LLE with exposed ulcer 12x7cm   Vascular: Peripheral pulses palpable 2+ bilaterally    MEDICATIONS:   MEDICATIONS  (STANDING):  amiodarone    Tablet 200 milliGRAM(s) Oral daily  ampicillin/sulbactam  IVPB      ampicillin/sulbactam  IVPB 3 Gram(s) IV Intermittent every 6 hours  aspirin  chewable 81 milliGRAM(s) Oral daily  atorvastatin 10 milliGRAM(s) Oral at bedtime  chlorhexidine 4% Liquid 1 Application(s) Topical <User Schedule>  clindamycin IVPB      clindamycin IVPB 300 milliGRAM(s) IV Intermittent every 8 hours  docusate sodium 100 milliGRAM(s) Oral two times a day  enoxaparin Injectable 40 milliGRAM(s) SubCutaneous daily  furosemide    Tablet 20 milliGRAM(s) Oral daily  labetalol 100 milliGRAM(s) Oral two times a day  levothyroxine 50 MICROGram(s) Oral daily  losartan 100 milliGRAM(s) Oral daily  oxybutynin 5 milliGRAM(s) Oral daily    MEDICATIONS  (PRN):  acetaminophen   Tablet .. 650 milliGRAM(s) Oral every 6 hours PRN Mild Pain (1 - 3)  aluminum hydroxide/magnesium hydroxide/simethicone Suspension 30 milliLiter(s) Oral every 6 hours PRN Dyspepsia  clonazePAM  Tablet 0.5 milliGRAM(s) Oral daily PRN anxiety/agitation      LAB/STUDIES:                        10.6   3.56  )-----------( 193      ( 25 May 2019 06:54 )             33.2     05-25    143  |  104  |  14  ----------------------------<  92  4.3   |  28  |  1.0    Ca    8.9      25 May 2019 06:54  Mg     2.4     05-24    TPro  6.8  /  Alb  3.7  /  TBili  0.4  /  DBili  x   /  AST  14  /  ALT  7   /  AlkPhos  71  05-24    PT/INR - ( 24 May 2019 15:20 )   PT: 12.70 sec;   INR: 1.11 ratio    PTT - ( 24 May 2019 15:20 )  PTT:32.2 sec  LIVER FUNCTIONS - ( 24 May 2019 15:01 )  Alb: 3.7 g/dL / Pro: 6.8 g/dL / ALK PHOS: 71 U/L / ALT: 7 U/L / AST: 14 U/L / GGT: x show

## 2024-08-12 NOTE — ED ADULT TRIAGE NOTE - MEANS OF ARRIVAL
"Patient c/o RUQ pain that radiates to her back. She states it is a dull pain. Denies changes to bowel/ bladder, the pain is her only symptom. She states she has family hx of gallbladder issues, and she wants to have her gallbladder checked.   She is scheduled for an appointment next week. She wants to know if she can either have blood work or imaging before then. Please follow up.     Reason for Disposition   Age > 60 years    Answer Assessment - Initial Assessment Questions  1. LOCATION: \"Where does it hurt?\"       RUQ pain    2. RADIATION: \"Does the pain shoot anywhere else?\" (e.g., chest, back)      Radiates to the back    3. ONSET: \"When did the pain begin?\" (e.g., minutes, hours or days ago)       Friday    4. SUDDEN: \"Gradual or sudden onset?\"      Gradual    5. PATTERN \"Does the pain come and go, or is it constant?\"  Comes and goes        6. SEVERITY: \"How bad is the pain?\"  (e.g., Scale 1-10; mild, moderate, or severe)    - MILD (1-3): doesn't interfere with normal activities, abdomen soft and not tender to touch     - MODERATE (4-7): interferes with normal activities or awakens from sleep, tender to touch   Dull pain          7. RECURRENT SYMPTOM: \"Have you ever had this type of stomach pain before?\" If Yes, ask: \"When was the last time?\" and \"What happened that time?\"         8. CAUSE: \"What do you think is causing the stomach pain?\"      Unknown    9. RELIEVING/AGGRAVATING FACTORS: \"What makes it better or worse?\" (e.g., movement, antacids, bowel movement)      Lay down    10. OTHER SYMPTOMS: \"Has there been any vomiting, diarrhea, constipation, or urine problems?\"        Denies    11. PREGNANCY: \"Is there any chance you are pregnant?\" \"When was your last menstrual period?\"        N/A    Protocols used: Abdominal Pain - Female-ADULT-AH    "
Patient understands and was notified.   
Patients symptoms started Saturday and now dull pain today. This also happened a couple of weeks ago and went away as well. No other symptoms with the pain and no change in bowels.   
Please call patient.  When did symptoms start? Any nausea, vomiting or reflux symptoms?  Is the pain related to eating food? Any change in bowel movements?  Last ultrasound was done 2016.  
Ultrasound ordered, can call and schedule.  Keep appt with Deya next week.    Recommend to avoid fatty, fried and rich foods, for now.  
stretcher

## 2025-07-09 NOTE — ED ADULT NURSE NOTE - CAS TRG GEN SKIN COLOR
"Anesthesia Transfer of Care Note    Patient: Yobani Walls    Procedure(s) Performed: * No procedures listed *    Patient location: PACU    Anesthesia Type: general    Transport from OR: Transported from OR on room air with adequate spontaneous ventilation    Post pain: adequate analgesia    Post assessment: tolerated procedure well and no apparent anesthetic complications    Post vital signs: stable    Level of consciousness: awake (Returned to baseline)    Nausea/Vomiting: no nausea/vomiting    Complications: none    Transfer of care protocol was followed    Last vitals: Visit Vitals  BP (!) 175/104 (BP Location: Left arm, Patient Position: Lying)   Pulse (!) 49   Temp 36.4 °C (97.5 °F) (Temporal)   Resp 15   Ht 5' 5" (1.651 m)   Wt 67.1 kg (148 lb)   SpO2 100%   BMI 24.63 kg/m²     " Normal for race